# Patient Record
Sex: MALE | Race: BLACK OR AFRICAN AMERICAN | Employment: OTHER
[De-identification: names, ages, dates, MRNs, and addresses within clinical notes are randomized per-mention and may not be internally consistent; named-entity substitution may affect disease eponyms.]

---

## 2017-01-23 ENCOUNTER — OFFICE VISIT (OUTPATIENT)
Dept: FAMILY MEDICINE CLINIC | Facility: CLINIC | Age: 72
End: 2017-01-23

## 2017-01-23 VITALS
BODY MASS INDEX: 30.81 KG/M2 | WEIGHT: 208 LBS | SYSTOLIC BLOOD PRESSURE: 160 MMHG | HEIGHT: 69 IN | DIASTOLIC BLOOD PRESSURE: 50 MMHG | HEART RATE: 78 BPM

## 2017-01-23 DIAGNOSIS — I10 ESSENTIAL HYPERTENSION: ICD-10-CM

## 2017-01-23 DIAGNOSIS — D64.9 NORMOCYTIC NORMOCHROMIC ANEMIA: ICD-10-CM

## 2017-01-23 DIAGNOSIS — K76.9 HEPATIC DYSFUNCTION: ICD-10-CM

## 2017-01-23 DIAGNOSIS — E78.2 MIXED HYPERLIPIDEMIA: Primary | ICD-10-CM

## 2017-01-23 DIAGNOSIS — N18.30 RENAL FAILURE, CHRONIC, STAGE 3 (MODERATE) (HCC): ICD-10-CM

## 2017-01-23 PROCEDURE — G8427 DOCREV CUR MEDS BY ELIG CLIN: HCPCS | Performed by: FAMILY MEDICINE

## 2017-01-23 PROCEDURE — 99213 OFFICE O/P EST LOW 20 MIN: CPT | Performed by: FAMILY MEDICINE

## 2017-01-23 PROCEDURE — G8419 CALC BMI OUT NRM PARAM NOF/U: HCPCS | Performed by: FAMILY MEDICINE

## 2017-01-23 PROCEDURE — 4040F PNEUMOC VAC/ADMIN/RCVD: CPT | Performed by: FAMILY MEDICINE

## 2017-01-23 PROCEDURE — 1036F TOBACCO NON-USER: CPT | Performed by: FAMILY MEDICINE

## 2017-01-23 PROCEDURE — 3017F COLORECTAL CA SCREEN DOC REV: CPT | Performed by: FAMILY MEDICINE

## 2017-01-23 PROCEDURE — G8484 FLU IMMUNIZE NO ADMIN: HCPCS | Performed by: FAMILY MEDICINE

## 2017-01-23 PROCEDURE — 1123F ACP DISCUSS/DSCN MKR DOCD: CPT | Performed by: FAMILY MEDICINE

## 2017-01-23 RX ORDER — AMLODIPINE BESYLATE 10 MG/1
10 TABLET ORAL DAILY
Qty: 90 TABLET | Refills: 3 | Status: SHIPPED | OUTPATIENT
Start: 2017-01-23 | End: 2018-03-17 | Stop reason: SDUPTHER

## 2017-01-23 ASSESSMENT — PATIENT HEALTH QUESTIONNAIRE - PHQ9
1. LITTLE INTEREST OR PLEASURE IN DOING THINGS: 0
SUM OF ALL RESPONSES TO PHQ9 QUESTIONS 1 & 2: 0
2. FEELING DOWN, DEPRESSED OR HOPELESS: 0
SUM OF ALL RESPONSES TO PHQ QUESTIONS 1-9: 0

## 2017-02-25 ASSESSMENT — ENCOUNTER SYMPTOMS
CONSTIPATION: 0
RHINORRHEA: 0
ABDOMINAL PAIN: 0
CHEST TIGHTNESS: 0
SHORTNESS OF BREATH: 0
COUGH: 0
NAUSEA: 0
TROUBLE SWALLOWING: 0
DIARRHEA: 0
SORE THROAT: 0
BACK PAIN: 0

## 2017-04-13 ENCOUNTER — HOSPITAL ENCOUNTER (OUTPATIENT)
Age: 72
Discharge: HOME | End: 2017-04-13
Payer: MEDICARE

## 2017-04-13 DIAGNOSIS — E78.2 MIXED HYPERLIPIDEMIA: ICD-10-CM

## 2017-04-13 DIAGNOSIS — K76.9 HEPATIC DYSFUNCTION: ICD-10-CM

## 2017-04-13 LAB
ALBUMIN SERPL-MCNC: 4.4 G/DL (ref 3.5–5.2)
ALBUMIN/GLOBULIN RATIO: ABNORMAL (ref 1–2.5)
ALP BLD-CCNC: 85 U/L (ref 40–129)
ALT SERPL-CCNC: 39 U/L (ref 5–41)
AST SERPL-CCNC: 45 U/L
BILIRUB SERPL-MCNC: 0.31 MG/DL (ref 0.3–1.2)
BILIRUBIN DIRECT: <0.08 MG/DL
BILIRUBIN, INDIRECT: ABNORMAL MG/DL (ref 0–1)
GLOBULIN: ABNORMAL G/DL (ref 1.5–3.8)
TOTAL PROTEIN: 7.8 G/DL (ref 6.4–8.3)

## 2017-04-13 PROCEDURE — 36415 COLL VENOUS BLD VENIPUNCTURE: CPT

## 2017-04-13 PROCEDURE — 80076 HEPATIC FUNCTION PANEL: CPT

## 2017-04-25 ENCOUNTER — OFFICE VISIT (OUTPATIENT)
Dept: FAMILY MEDICINE CLINIC | Age: 72
End: 2017-04-25
Payer: MEDICARE

## 2017-04-25 VITALS
HEIGHT: 69 IN | SYSTOLIC BLOOD PRESSURE: 140 MMHG | DIASTOLIC BLOOD PRESSURE: 70 MMHG | BODY MASS INDEX: 30.3 KG/M2 | HEART RATE: 76 BPM | WEIGHT: 204.6 LBS

## 2017-04-25 DIAGNOSIS — I10 ESSENTIAL HYPERTENSION: Primary | ICD-10-CM

## 2017-04-25 DIAGNOSIS — K76.9 HEPATIC DYSFUNCTION: ICD-10-CM

## 2017-04-25 DIAGNOSIS — E78.2 MIXED HYPERLIPIDEMIA: ICD-10-CM

## 2017-04-25 DIAGNOSIS — Z23 NEED FOR PNEUMOCOCCAL VACCINATION: ICD-10-CM

## 2017-04-25 DIAGNOSIS — N18.30 RENAL FAILURE, CHRONIC, STAGE 3 (MODERATE) (HCC): ICD-10-CM

## 2017-04-25 DIAGNOSIS — D64.9 NORMOCYTIC NORMOCHROMIC ANEMIA: ICD-10-CM

## 2017-04-25 PROCEDURE — 99213 OFFICE O/P EST LOW 20 MIN: CPT | Performed by: FAMILY MEDICINE

## 2017-04-25 PROCEDURE — G8419 CALC BMI OUT NRM PARAM NOF/U: HCPCS | Performed by: FAMILY MEDICINE

## 2017-04-25 PROCEDURE — 4040F PNEUMOC VAC/ADMIN/RCVD: CPT | Performed by: FAMILY MEDICINE

## 2017-04-25 PROCEDURE — G8427 DOCREV CUR MEDS BY ELIG CLIN: HCPCS | Performed by: FAMILY MEDICINE

## 2017-04-25 PROCEDURE — 1036F TOBACCO NON-USER: CPT | Performed by: FAMILY MEDICINE

## 2017-04-25 PROCEDURE — 1123F ACP DISCUSS/DSCN MKR DOCD: CPT | Performed by: FAMILY MEDICINE

## 2017-04-25 PROCEDURE — 3017F COLORECTAL CA SCREEN DOC REV: CPT | Performed by: FAMILY MEDICINE

## 2017-04-25 PROCEDURE — 90670 PCV13 VACCINE IM: CPT | Performed by: FAMILY MEDICINE

## 2017-04-25 PROCEDURE — G0009 ADMIN PNEUMOCOCCAL VACCINE: HCPCS | Performed by: FAMILY MEDICINE

## 2017-04-25 RX ORDER — ATORVASTATIN CALCIUM 10 MG/1
10 TABLET, FILM COATED ORAL DAILY
Qty: 30 TABLET | Refills: 3 | Status: SHIPPED | OUTPATIENT
Start: 2017-04-25 | End: 2017-10-04 | Stop reason: ALTCHOICE

## 2017-04-25 ASSESSMENT — ENCOUNTER SYMPTOMS
CONSTIPATION: 0
SHORTNESS OF BREATH: 0
SORE THROAT: 0
ABDOMINAL PAIN: 0
TROUBLE SWALLOWING: 0
BACK PAIN: 0
CHEST TIGHTNESS: 0
COUGH: 0
RHINORRHEA: 0
NAUSEA: 0
DIARRHEA: 0

## 2017-04-25 ASSESSMENT — PATIENT HEALTH QUESTIONNAIRE - PHQ9
2. FEELING DOWN, DEPRESSED OR HOPELESS: 0
1. LITTLE INTEREST OR PLEASURE IN DOING THINGS: 0
SUM OF ALL RESPONSES TO PHQ QUESTIONS 1-9: 0
SUM OF ALL RESPONSES TO PHQ9 QUESTIONS 1 & 2: 0

## 2017-09-18 ENCOUNTER — TELEPHONE (OUTPATIENT)
Dept: FAMILY MEDICINE CLINIC | Age: 72
End: 2017-09-18

## 2017-09-27 ENCOUNTER — HOSPITAL ENCOUNTER (OUTPATIENT)
Age: 72
Discharge: HOME | End: 2017-09-27
Payer: MEDICARE

## 2017-09-27 DIAGNOSIS — I10 ESSENTIAL HYPERTENSION: ICD-10-CM

## 2017-09-27 DIAGNOSIS — D64.9 NORMOCYTIC NORMOCHROMIC ANEMIA: ICD-10-CM

## 2017-09-27 DIAGNOSIS — E78.2 MIXED HYPERLIPIDEMIA: ICD-10-CM

## 2017-09-27 LAB
ABSOLUTE EOS #: 0.2 K/UL (ref 0–0.4)
ABSOLUTE LYMPH #: 1.9 K/UL (ref 1–4.8)
ABSOLUTE MONO #: 0.4 K/UL (ref 0.2–0.8)
ALBUMIN SERPL-MCNC: 4.2 G/DL (ref 3.5–5.2)
ALBUMIN/GLOBULIN RATIO: ABNORMAL (ref 1–2.5)
ALP BLD-CCNC: 81 U/L (ref 40–129)
ALT SERPL-CCNC: 29 U/L (ref 5–41)
ANION GAP SERPL CALCULATED.3IONS-SCNC: 14 MMOL/L (ref 9–17)
AST SERPL-CCNC: 34 U/L
BASOPHILS # BLD: 2 %
BASOPHILS ABSOLUTE: 0.1 K/UL (ref 0–0.2)
BILIRUB SERPL-MCNC: 0.4 MG/DL (ref 0.3–1.2)
BUN BLDV-MCNC: 20 MG/DL (ref 8–23)
BUN/CREAT BLD: 11 (ref 9–20)
CALCIUM SERPL-MCNC: 9.6 MG/DL (ref 8.6–10.4)
CHLORIDE BLD-SCNC: 99 MMOL/L (ref 98–107)
CHOLESTEROL/HDL RATIO: 6.1
CHOLESTEROL: 244 MG/DL
CO2: 27 MMOL/L (ref 20–31)
CREAT SERPL-MCNC: 1.81 MG/DL (ref 0.7–1.2)
DIFFERENTIAL TYPE: ABNORMAL
EOSINOPHILS RELATIVE PERCENT: 3 %
GFR AFRICAN AMERICAN: 45 ML/MIN
GFR NON-AFRICAN AMERICAN: 37 ML/MIN
GFR SERPL CREATININE-BSD FRML MDRD: ABNORMAL ML/MIN/{1.73_M2}
GFR SERPL CREATININE-BSD FRML MDRD: ABNORMAL ML/MIN/{1.73_M2}
GLUCOSE BLD-MCNC: 98 MG/DL (ref 70–99)
HCT VFR BLD CALC: 38.3 % (ref 41–53)
HDLC SERPL-MCNC: 40 MG/DL
HEMOGLOBIN: 12.9 G/DL (ref 13.5–17.5)
LDL CHOLESTEROL: 182 MG/DL (ref 0–130)
LYMPHOCYTES # BLD: 29 %
MCH RBC QN AUTO: 28.2 PG (ref 26–34)
MCHC RBC AUTO-ENTMCNC: 33.5 G/DL (ref 31–37)
MCV RBC AUTO: 84 FL (ref 80–100)
MONOCYTES # BLD: 6 %
PDW BLD-RTO: 13.7 % (ref 11.5–14.5)
PLATELET # BLD: 267 K/UL (ref 130–400)
PLATELET ESTIMATE: ABNORMAL
PMV BLD AUTO: ABNORMAL FL (ref 6–12)
POTASSIUM SERPL-SCNC: 4.6 MMOL/L (ref 3.7–5.3)
RBC # BLD: 4.56 M/UL (ref 4.5–5.9)
RBC # BLD: ABNORMAL 10*6/UL
SEG NEUTROPHILS: 60 %
SEGMENTED NEUTROPHILS ABSOLUTE COUNT: 3.8 K/UL (ref 1.8–7.7)
SODIUM BLD-SCNC: 140 MMOL/L (ref 135–144)
TOTAL PROTEIN: 8.1 G/DL (ref 6.4–8.3)
TRIGL SERPL-MCNC: 112 MG/DL
VLDLC SERPL CALC-MCNC: ABNORMAL MG/DL (ref 1–30)
WBC # BLD: 6.4 K/UL (ref 3.5–11)
WBC # BLD: ABNORMAL 10*3/UL

## 2017-09-27 PROCEDURE — 80061 LIPID PANEL: CPT

## 2017-09-27 PROCEDURE — 36415 COLL VENOUS BLD VENIPUNCTURE: CPT

## 2017-09-27 PROCEDURE — 85025 COMPLETE CBC W/AUTO DIFF WBC: CPT

## 2017-09-27 PROCEDURE — 80053 COMPREHEN METABOLIC PANEL: CPT

## 2017-10-04 ENCOUNTER — OFFICE VISIT (OUTPATIENT)
Dept: FAMILY MEDICINE CLINIC | Age: 72
End: 2017-10-04
Payer: MEDICARE

## 2017-10-04 VITALS
SYSTOLIC BLOOD PRESSURE: 150 MMHG | BODY MASS INDEX: 30.45 KG/M2 | WEIGHT: 205.6 LBS | DIASTOLIC BLOOD PRESSURE: 76 MMHG | HEART RATE: 77 BPM | HEIGHT: 69 IN

## 2017-10-04 DIAGNOSIS — D64.9 NORMOCYTIC NORMOCHROMIC ANEMIA: ICD-10-CM

## 2017-10-04 DIAGNOSIS — N18.30 RENAL FAILURE, CHRONIC, STAGE 3 (MODERATE) (HCC): ICD-10-CM

## 2017-10-04 DIAGNOSIS — E78.2 MIXED HYPERLIPIDEMIA: ICD-10-CM

## 2017-10-04 DIAGNOSIS — I10 ESSENTIAL HYPERTENSION: Primary | ICD-10-CM

## 2017-10-04 PROCEDURE — 3017F COLORECTAL CA SCREEN DOC REV: CPT | Performed by: FAMILY MEDICINE

## 2017-10-04 PROCEDURE — 4040F PNEUMOC VAC/ADMIN/RCVD: CPT | Performed by: FAMILY MEDICINE

## 2017-10-04 PROCEDURE — G8417 CALC BMI ABV UP PARAM F/U: HCPCS | Performed by: FAMILY MEDICINE

## 2017-10-04 PROCEDURE — G8484 FLU IMMUNIZE NO ADMIN: HCPCS | Performed by: FAMILY MEDICINE

## 2017-10-04 PROCEDURE — 1123F ACP DISCUSS/DSCN MKR DOCD: CPT | Performed by: FAMILY MEDICINE

## 2017-10-04 PROCEDURE — 99213 OFFICE O/P EST LOW 20 MIN: CPT | Performed by: FAMILY MEDICINE

## 2017-10-04 PROCEDURE — G8427 DOCREV CUR MEDS BY ELIG CLIN: HCPCS | Performed by: FAMILY MEDICINE

## 2017-10-04 PROCEDURE — 1036F TOBACCO NON-USER: CPT | Performed by: FAMILY MEDICINE

## 2017-10-04 RX ORDER — ATORVASTATIN CALCIUM 10 MG/1
10 TABLET, FILM COATED ORAL DAILY
Qty: 30 TABLET | Refills: 3 | Status: SHIPPED | OUTPATIENT
Start: 2017-10-04 | End: 2018-01-25 | Stop reason: DRUGHIGH

## 2017-10-04 ASSESSMENT — ENCOUNTER SYMPTOMS
CONSTIPATION: 0
CHEST TIGHTNESS: 0
ABDOMINAL PAIN: 0
SORE THROAT: 0
DIARRHEA: 0
NAUSEA: 0
BACK PAIN: 0
TROUBLE SWALLOWING: 0
RHINORRHEA: 0
SHORTNESS OF BREATH: 0
COUGH: 0

## 2017-10-04 NOTE — PROGRESS NOTES
[address]  [facility]  [address] [place] [address]  Dept: [phone]  Dept Fax: [phone]    Madina Sam is a 81 y.o. male who presents today for his medical conditions/complaints as noted below. Madina Sam is c/o of Hypertension (pt here for 6 mth follow-up)      HPI:     HPI Comments: Patient is here for follow-up of his medical problems. Patient states he has been doing well. He has been seeing his nephrologist.  His kidney function is stable. Also patient states he was told to stop his statins because there was some liver problem. Has no headaches or dizziness. No chest pain or shortness of breath. Weight is unchanged. Blood pressure is stable. Nonsmoker. Social History   Substance Use Topics    Smoking status: Never Smoker    Smokeless tobacco: Never Used    Alcohol use No      Current Outpatient Prescriptions   Medication Sig Dispense Refill    atorvastatin (LIPITOR) 10 MG tablet Take 1 tablet by mouth daily 30 tablet 3    amLODIPine (NORVASC) 10 MG tablet Take 1 tablet by mouth daily 90 tablet 3     No current facility-administered medications for this visit. No Known Allergies      Subjective:      Review of Systems   Constitutional: Negative for chills, diaphoresis, fatigue and fever. HENT: Negative for congestion, ear pain, rhinorrhea, sore throat and trouble swallowing. Eyes: Negative for visual disturbance. Respiratory: Negative for cough, chest tightness and shortness of breath. Cardiovascular: Negative for chest pain, palpitations and leg swelling. Gastrointestinal: Negative for abdominal pain, constipation, diarrhea and nausea. Endocrine: Negative for polydipsia and polyuria. Genitourinary: Negative for difficulty urinating, dysuria and hematuria. Musculoskeletal: Negative for arthralgias, back pain, gait problem and neck pain. Skin: Negative for rash.    Neurological: Negative for dizziness, Standing Expiration Date:   10/4/2018    AST     Standing Status:   Future     Standing Expiration Date:   10/4/2018     Orders Placed This Encounter   Medications    atorvastatin (LIPITOR) 10 MG tablet     Sig: Take 1 tablet by mouth daily     Dispense:  30 tablet     Refill:  3         Findings and/or pathophysiology discussed with patient. Plan of treatment discussed. Patient's medical problems were discussed with him. Findings were explained. His lab work was discussed. His lipids are elevated. His liver function is normal.  Will restart atorvastatin at 10 mg daily. We will repeat his lab work in 3 months time. His other medications were evaluated. Health maintenance was discussed. Weight management was discussed. Diet and activity were discussed. He will follow-up with his nephrologist.  Patient exercises 6 days a week. 1.  Luisa Going received counseling on the following healthy behaviors: nutrition and exercise  2. Patient given educational materials - see patient instructions  3. Was a self-tracking handout given in paper form or via [employer]? No  If yes, see orders or list here. 4.  Discussed use, benefit, and side effects of prescribed medications. Barriers to medication compliance addressed. All patient questions answered. Pt voiced understanding. 5.  Reviewed prior labs and health maintenance  6. Continue current medications, diet and exercise.     Completed Refills   Requested Prescriptions     Signed Prescriptions Disp Refills    atorvastatin (LIPITOR) 10 MG tablet 30 tablet 3     Sig: Take 1 tablet by mouth daily     Electronically signed by Je Marrufo MD on 10/4/2017 at 12:41 PM

## 2017-10-04 NOTE — PROGRESS NOTES
Chronic Disease Visit Information    BP Readings from Last 3 Encounters:   04/25/17 (!) 140/70   01/23/17 (!) 160/50   10/24/16 125/78          LDL Cholesterol (mg/dL)   Date Value   09/27/2017 182 (H)     HDL (mg/dL)   Date Value   09/27/2017 40 (L)     BUN (mg/dL)   Date Value   09/27/2017 20     CREATININE (mg/dL)   Date Value   09/27/2017 1.81 (H)     Glucose (mg/dL)   Date Value   09/27/2017 98            Have you changed or started any medications since your last visit including any over-the-counter medicines, vitamins, or herbal medicines? no   Are you having any side effects from any of your medications? -  no  Have you stopped taking any of your medications? Is so, why? -  no    Have you seen any other physician or provider since your last visit? Yes - Records Obtained  Have you had any other diagnostic tests since your last visit? Yes - Records Obtained  Have you been seen in the emergency room and/or had an admission to a hospital since we last saw you? No  Have you had your annual diabetic retinal (eye) exam? No  Have you had your routine dental cleaning in the past 6 months? yes -     Have you activated your [employer] account? If not, what are your barriers?  Yes     Patient Care Team:  Anika Bernabe MD as PCP - General (Family Medicine)  Anika Bernabe MD as PCP - [facility] Attributed Provider  Britt Bowling MD as Consulting Physician (Urology)         Medical History Review  Past Medical, Family, and Social History reviewed and does contribute to the patient presenting condition    Health Maintenance   Topic Date Due    DTaP/Tdap/Td vaccine (1 - Tdap) 04/25/2018 (Originally [birth year]24/1964)    Flu vaccine (1) 10/04/2018 (Originally 9/1/2017)    Colon cancer screen colonoscopy  04/01/2022    Lipid screen  09/27/2022    Zostavax vaccine  Addressed    Pneumococcal low/med risk  Completed    Hepatitis C screen  Completed

## 2017-10-04 NOTE — MR AVS SNAPSHOT
After Visit Summary             Reema Moreno   10/4/2017 9:15 AM   Office Visit    Description:  Male : 1945   Provider:  Skyla Beckman MD   Department:  [address] [place] [address] Drive and Future Appointments         Below is a list of your follow-up and future appointments. This may not be a complete list as you may have made appointments directly with providers that we are not aware of or your providers may have made some for you. Please call your providers to confirm appointments. It is important to keep your appointments. Please bring your current insurance card, photo ID, co-pay, and all medication bottles to your appointment. If self-pay, payment is expected at the time of service. Your To-Do List     Future Appointments Provider Department Dept Phone    2018 9:15 AM [place] [address] [phone]    Please arrive 15 minutes prior to appointment, bring photo ID and insurance card. Future Orders Complete By Expires    ALT [LJE247 Custom]  10/4/2017 10/4/2018    AST [[id number] Custom]  10/4/2017 10/4/2018    Follow-Up    Return in about 3 months (around 2018). Information from Your Visit        Department     Name Address Phone Fax    [address] [place] [address] [phone]      You Were Seen for:         Comments    Essential hypertension   [003282]         Vital Signs     Blood Pressure Pulse Height Weight Body Mass Index Smoking Status    150/76 (Site: Left Arm, Position: Sitting, Cuff Size: Large Adult) 77 5' 9\" (1.753 m) 205 lb 9.6 oz (93.3 kg) 30.36 kg/m2 Never Smoker      Additional Information about your Body Mass Index (BMI)           Your BMI as listed above is considered obese (30 or more). BMI is an estimate of body fat, calculated from your height and weight.   The higher Be safe with medicines. Take your medicine exactly as prescribed. Call your doctor if you think you are having a problem with your medicine. · Talk to your doctor before you start taking aspirin every day. Aspirin can help certain people lower their risk of a heart attack or stroke. But taking aspirin isn't right for everyone, because it can cause serious bleeding. · See your doctor regularly. You may need to see the doctor more often at first or until your blood pressure comes down. · If you are taking blood pressure medicine, talk to your doctor before you take decongestants or anti-inflammatory medicine, such as ibuprofen. Some of these medicines can raise blood pressure. · Learn how to check your blood pressure at home. Lifestyle changes  · Stay at a healthy weight. This is especially important if you put on weight around the waist. Losing even 10 pounds can help you lower your blood pressure. · If your doctor recommends it, get more exercise. Walking is a good choice. Bit by bit, increase the amount you walk every day. Try for at least 30 minutes on most days of the week. You also may want to swim, bike, or do other activities. · Avoid or limit alcohol. Talk to your doctor about whether you can drink any alcohol. · Try to limit how much sodium you eat to less than 2,300 milligrams (mg) a day. Your doctor may ask you to try to eat less than 1,500 mg a day. · Eat plenty of fruits (such as bananas and oranges), vegetables, legumes, whole grains, and low-fat dairy products. · Lower the amount of saturated fat in your diet. Saturated fat is found in animal products such as milk, cheese, and meat. Limiting these foods may help you lose weight and also lower your risk for heart disease. · Do not smoke. Smoking increases your risk for heart attack and stroke. If you need help quitting, talk to your doctor about stop-smoking programs and medicines. These can increase your chances of quitting for good. When should you call for help? Call 911 anytime you think you may need emergency care. This may mean having symptoms that suggest that your blood pressure is causing a serious heart or blood vessel problem. Your blood pressure may be over 180/110. For example, call 911 if:  · You have symptoms of a heart attack. These may include:  ¨ Chest pain or pressure, or a strange feeling in the chest.  ¨ Sweating. ¨ Shortness of breath. ¨ Nausea or vomiting. ¨ Pain, pressure, or a strange feeling in the back, neck, jaw, or upper belly or in one or both shoulders or arms. ¨ Lightheadedness or sudden weakness. ¨ A fast or irregular heartbeat. · You have symptoms of a stroke. These may include:  ¨ Sudden numbness, tingling, weakness, or loss of movement in your face, arm, or leg, especially on only one side of your body. ¨ Sudden vision changes. ¨ Sudden trouble speaking. ¨ Sudden confusion or trouble understanding simple statements. ¨ Sudden problems with walking or balance. ¨ A sudden, severe headache that is different from past headaches. · You have severe back or belly pain. Do not wait until your blood pressure comes down on its own. Get help right away. Call your doctor now or seek immediate care if:  · Your blood pressure is much higher than normal (such as 180/110 or higher), but you don't have symptoms. · You think high blood pressure is causing symptoms, such as:  ¨ Severe headache. ¨ Blurry vision. Watch closely for changes in your health, and be sure to contact your doctor if:  · Your blood pressure measures 140/90 or higher at least 2 times. That means the top number is 140 or higher or the bottom number is 90 or higher, or both. · You think you may be having side effects from your blood pressure medicine. · Your blood pressure is usually normal, but it goes above normal at least 2 times. Where can you learn more? Go to [email or web address]://chfortinoeb.[employer]. org and sign in to your MyChart Urinary tract infection    Sepsis ([facility]    Acute on chronic kidney failure ([address]    ARF (acute renal failure) ([facility] [address] [facility]    Obstructive uropathy    S/P laminectomy    Renal dysfunction    Right lumbosacral radiculopathy    Sprain hip/thigh    Unstable gait    Obesity    Sprain, lumbosacral      Immunizations as of 10/4/2017     Name Date    Pneumococcal 13-valent Conjugate (Tyrone Handsome) 4/25/2017    Pneumococcal Polysaccharide ([id number]) 12/18/2014      Preventive Care        Date Due    Tetanus Combination Vaccine (1 - Tdap) 4/25/2018 (Originally 4/24/1966)    Yearly Flu Vaccine (1) 10/4/2018 (Originally 9/1/2017)    Colonoscopy 4/1/2022    Cholesterol Screening 9/27/2022            [employer] Signup           Our records indicate that you have an active [employer] account. You can view your After Visit Summary by going to [email or web address] and logging in with your [employer] username and password. If you don't have a [employer] username and password but a parent or guardian has access to your record, the parent or guardian should login with their own [employer] username and password and access your record to view the After Visit Summary. Additional Information  If you have questions, please contact the physician practice where you receive care. Remember, [employer] is NOT to be used for urgent needs. For medical emergencies, dial 911. For questions regarding your [employer] account call [phone]. If you have a clinical question, please call your doctor's office.

## 2018-01-16 ENCOUNTER — HOSPITAL ENCOUNTER (OUTPATIENT)
Age: 73
Discharge: HOME | End: 2018-01-16
Payer: MEDICARE

## 2018-01-16 DIAGNOSIS — E78.2 MIXED HYPERLIPIDEMIA: ICD-10-CM

## 2018-01-16 LAB
ALT SERPL-CCNC: 32 U/L (ref 5–41)
AST SERPL-CCNC: 39 U/L

## 2018-01-16 PROCEDURE — 84460 ALANINE AMINO (ALT) (SGPT): CPT

## 2018-01-16 PROCEDURE — 36415 COLL VENOUS BLD VENIPUNCTURE: CPT

## 2018-01-16 PROCEDURE — 84450 TRANSFERASE (AST) (SGOT): CPT

## 2018-01-25 ENCOUNTER — OFFICE VISIT (OUTPATIENT)
Dept: FAMILY MEDICINE CLINIC | Age: 73
End: 2018-01-25
Payer: MEDICARE

## 2018-01-25 VITALS
WEIGHT: 202.2 LBS | BODY MASS INDEX: 29.95 KG/M2 | HEART RATE: 70 BPM | SYSTOLIC BLOOD PRESSURE: 161 MMHG | DIASTOLIC BLOOD PRESSURE: 82 MMHG | HEIGHT: 69 IN

## 2018-01-25 DIAGNOSIS — D63.1 ANEMIA IN STAGE 3 CHRONIC KIDNEY DISEASE (HCC): Chronic | ICD-10-CM

## 2018-01-25 DIAGNOSIS — N18.30 ANEMIA IN STAGE 3 CHRONIC KIDNEY DISEASE (HCC): Chronic | ICD-10-CM

## 2018-01-25 DIAGNOSIS — N18.30 CHRONIC RENAL FAILURE, STAGE 3 (MODERATE) (HCC): ICD-10-CM

## 2018-01-25 DIAGNOSIS — E66.3 OVERWEIGHT: ICD-10-CM

## 2018-01-25 DIAGNOSIS — I10 ESSENTIAL HYPERTENSION: Primary | ICD-10-CM

## 2018-01-25 DIAGNOSIS — E78.2 MIXED HYPERLIPIDEMIA: ICD-10-CM

## 2018-01-25 PROCEDURE — 3017F COLORECTAL CA SCREEN DOC REV: CPT | Performed by: FAMILY MEDICINE

## 2018-01-25 PROCEDURE — 4040F PNEUMOC VAC/ADMIN/RCVD: CPT | Performed by: FAMILY MEDICINE

## 2018-01-25 PROCEDURE — 99213 OFFICE O/P EST LOW 20 MIN: CPT | Performed by: FAMILY MEDICINE

## 2018-01-25 PROCEDURE — 1123F ACP DISCUSS/DSCN MKR DOCD: CPT | Performed by: FAMILY MEDICINE

## 2018-01-25 PROCEDURE — 1036F TOBACCO NON-USER: CPT | Performed by: FAMILY MEDICINE

## 2018-01-25 PROCEDURE — G8427 DOCREV CUR MEDS BY ELIG CLIN: HCPCS | Performed by: FAMILY MEDICINE

## 2018-01-25 PROCEDURE — G8417 CALC BMI ABV UP PARAM F/U: HCPCS | Performed by: FAMILY MEDICINE

## 2018-01-25 PROCEDURE — G8484 FLU IMMUNIZE NO ADMIN: HCPCS | Performed by: FAMILY MEDICINE

## 2018-01-25 RX ORDER — ATORVASTATIN CALCIUM 20 MG/1
20 TABLET, FILM COATED ORAL DAILY
Qty: 30 TABLET | Refills: 3 | Status: SHIPPED | OUTPATIENT
Start: 2018-01-25 | End: 2018-04-25 | Stop reason: SDUPTHER

## 2018-01-25 ASSESSMENT — ENCOUNTER SYMPTOMS
BACK PAIN: 0
DIARRHEA: 0
CHEST TIGHTNESS: 0
SORE THROAT: 0
CONSTIPATION: 0
NAUSEA: 0
RHINORRHEA: 0
ABDOMINAL PAIN: 0
COUGH: 0
SHORTNESS OF BREATH: 0
TROUBLE SWALLOWING: 0

## 2018-01-25 NOTE — PROGRESS NOTES
Negative for dysphoric mood and sleep disturbance. The patient is not nervous/anxious. Objective:     Physical Exam   Constitutional: He is oriented to person, place, and time. He appears well-developed and well-nourished. HENT:   Head: Normocephalic. Nose: Mucosal edema present. Mouth/Throat: Oropharynx is clear and moist. No oropharyngeal exudate. Eyes: Pupils are equal, round, and reactive to light. No scleral icterus. Neck: Normal range of motion. Neck supple. No JVD present. No thyromegaly present. Neck no bruits. Cardiovascular: Normal rate, regular rhythm and normal heart sounds. Exam reveals no gallop. No murmur heard. Pulmonary/Chest: Effort normal and breath sounds normal. He has no wheezes. He has no rales. Abdominal: Soft. Bowel sounds are normal. He exhibits no mass. There is no tenderness. Liver & spleen are not palpable. Musculoskeletal: Normal range of motion. He exhibits no edema or tenderness. Lumbar back: Normal.   He moves all his limbs well. There is some stiffness of his hip joints and lower back. SLR about 60°. No tremors noted. Gait is stable. Lymphadenopathy:     He has no cervical adenopathy. Neurological: He is alert and oriented to person, place, and time. He has normal reflexes. Coordination and gait normal.   Skin: Skin is warm and dry. No rash noted. Psychiatric: He has a normal mood and affect. His behavior is normal. Judgment and thought content normal.   Nursing note and vitals reviewed. BP (!) 161/81 (Site: Left Arm, Position: Sitting, Cuff Size: Large Adult)   Pulse 74   Ht 5' 9.02\" (1.753 m)   Wt 202 lb 3.2 oz (91.7 kg)   BMI 29.85 kg/m²     Assessment:      1. Essential hypertension     2. Mixed hyperlipidemia  atorvastatin (LIPITOR) 20 MG tablet    Lipid Panel    AST    ALT   3. Chronic renal failure, stage 3 (moderate)     4. 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  Overweight         Plan:      No Follow-up on (LIPITOR) 20 MG tablet 30 tablet 3     Sig: Take 1 tablet by mouth daily     Electronically signed by Rachel Mcmanus MD on 1/25/2018 at 10:21 AM

## 2018-01-25 NOTE — PATIENT INSTRUCTIONS
take decongestants or anti-inflammatory medicine, such as ibuprofen. Some of these medicines can raise blood pressure. · Learn how to check your blood pressure at home. Lifestyle changes  · Stay at a healthy weight. This is especially important if you put on weight around the waist. Losing even 10 pounds can help you lower your blood pressure. · If your doctor recommends it, get more exercise. Walking is a good choice. Bit by bit, increase the amount you walk every day. Try for at least 30 minutes on most days of the week. You also may want to swim, bike, or do other activities. · Avoid or limit alcohol. Talk to your doctor about whether you can drink any alcohol. · Try to limit how much sodium you eat to less than 2,300 milligrams (mg) a day. Your doctor may ask you to try to eat less than 1,500 mg a day. · Eat plenty of fruits (such as bananas and oranges), vegetables, legumes, whole grains, and low-fat dairy products. · Lower the amount of saturated fat in your diet. Saturated fat is found in animal products such as milk, cheese, and meat. Limiting these foods may help you lose weight and also lower your risk for heart disease. · Do not smoke. Smoking increases your risk for heart attack and stroke. If you need help quitting, talk to your doctor about stop-smoking programs and medicines. These can increase your chances of quitting for good. When should you call for help? Call 911 anytime you think you may need emergency care. This may mean having symptoms that suggest that your blood pressure is causing a serious heart or blood vessel problem. Your blood pressure may be over 180/110. ? For example, call 911 if:  ? · You have symptoms of a heart attack. These may include:  ¨ Chest pain or pressure, or a strange feeling in the chest.  ¨ Sweating. ¨ Shortness of breath. ¨ Nausea or vomiting.   ¨ Pain, pressure, or a strange feeling in the back, neck, jaw, or upper belly or in one or both shoulders or information.

## 2018-01-25 NOTE — PROGRESS NOTES
Chronic Disease Visit Information    BP Readings from Last 3 Encounters:   10/04/17 (!) 150/76   04/25/17 (!) 140/70   01/23/17 (!) 160/50          LDL Cholesterol (mg/dL)   Date Value   09/27/2017 182 (H)     HDL (mg/dL)   Date Value   09/27/2017 40 (L)     BUN (mg/dL)   Date Value   09/27/2017 20     CREATININE (mg/dL)   Date Value   09/27/2017 1.81 (H)     Glucose (mg/dL)   Date Value   09/27/2017 98            Have you changed or started any medications since your last visit including any over-the-counter medicines, vitamins, or herbal medicines? no   Are you having any side effects from any of your medications? -  no  Have you stopped taking any of your medications? Is so, why? -  no    Have you seen any other physician or provider since your last visit? No  Have you had any other diagnostic tests since your last visit? No  Have you been seen in the emergency room and/or had an admission to a hospital since we last saw you? No  Have you had your annual diabetic retinal (eye) exam? No  Have you had your routine dental cleaning in the past 6 months? no    Have you activated your [employer] account? If not, what are your barriers?  Yes     Patient Care Team:  Meet Anton MD as PCP - General (Family Medicine)  Meet Anton MD as PCP - S Attributed Provider  Gaurav Casey MD as Consulting Physician (Urology)         Medical History Review  Past Medical, Family, and Social History reviewed and does contribute to the patient presenting condition    Health Maintenance   Topic Date Due    DTaP/Tdap/Td vaccine (1 - Tdap) 04/25/2018 (Originally 4/24/1966)    Flu vaccine (1) 10/04/2018 (Originally 9/1/2017)    Potassium monitoring  09/27/2018    Creatinine monitoring  09/27/2018    Colon cancer screen colonoscopy  04/01/2022    Lipid screen  09/27/2022    Zostavax vaccine  Addressed    Pneumococcal low/med risk  Completed    Hepatitis C screen  Completed

## 2018-02-02 DIAGNOSIS — E78.2 MIXED HYPERLIPIDEMIA: ICD-10-CM

## 2018-03-17 DIAGNOSIS — I10 ESSENTIAL HYPERTENSION: ICD-10-CM

## 2018-03-19 NOTE — TELEPHONE ENCOUNTER
Requested Prescriptions     Pending Prescriptions Disp Refills    amLODIPine (NORVASC) 10 MG tablet [Pharmacy Med Name:  AmLODIPine Besylate 10MG TAB] 90 tablet 3     Sig: TAKE ONE TABLET BY MOUTH ONCE DAILY     Next Visit Date:  Future Appointments  Date Time Provider Gabriela Andrade   4/25/2018 9:00 AM Judy Roche MD [address] Maintenance   Topic Date Due    Shingles Vaccine (1 of 2 - 2 Dose Series) 04/24/1997    DTaP/Tdap/Td vaccine (1 - Tdap) 04/25/2018 (Originally 4/24/1966)    Flu vaccine (1) 10/04/2018 (Originally 9/1/2017)    Potassium monitoring  09/27/2018    Creatinine monitoring  09/27/2018    Colon cancer screen colonoscopy  04/01/2022    Lipid screen  09/27/2022    Pneumococcal low/med risk  Completed    Hepatitis C screen  Completed       No results found for: LABA1C          ( goal A1C is < 7)   No results found for: LABMICR  LDL Cholesterol (mg/dL)   Date Value   09/27/2017 182 (H)       (goal LDL is <100)   AST (U/L)   Date Value   01/16/2018 39     ALT (U/L)   Date Value   01/16/2018 32     BUN (mg/dL)   Date Value   09/27/2017 20     BP Readings from Last 3 Encounters:   01/25/18 (!) 161/82   10/04/17 (!) 150/76   04/25/17 (!) 140/70          (goal 120/80)    All Future Testing planned in [facility]  Lab Frequency Next Occurrence   Lipid Panel Once 07/24/2018               Patient Active Problem List:     Obesity     Sprain, lumbosacral     Sprain hip/thigh     Unstable gait     Right lumbosacral radiculopathy     S/P laminectomy     Renal dysfunction     ARF (acute renal failure) (HCC)     Obstructive uropathy     Urinary tract infection     Sepsis ([address]     Bladder outlet obstruction     Sepsis due to Klebsiella pneumoniae ([address]     Anemia in CKD (chronic kidney disease) ([address]     Problem with medical care compliance     Hepatic dysfunction     Mixed hyperlipidemia     Essential hypertension     Renal failure, chronic     Normocytic normochromic anemia Non morbid obesity due to excess calories     Overweight

## 2018-03-20 RX ORDER — AMLODIPINE BESYLATE 10 MG/1
TABLET ORAL
Qty: 90 TABLET | Refills: 3 | Status: SHIPPED | OUTPATIENT
Start: 2018-03-20 | End: 2019-05-30 | Stop reason: SDUPTHER

## 2018-04-17 ENCOUNTER — HOSPITAL ENCOUNTER (OUTPATIENT)
Age: 73
Discharge: HOME | End: 2018-04-17
Payer: MEDICARE

## 2018-04-17 DIAGNOSIS — E78.2 MIXED HYPERLIPIDEMIA: ICD-10-CM

## 2018-04-17 LAB
ALT SERPL-CCNC: 37 U/L (ref 5–41)
AST SERPL-CCNC: 38 U/L
CHOLESTEROL/HDL RATIO: 5
CHOLESTEROL: 219 MG/DL
HDLC SERPL-MCNC: 44 MG/DL
LDL CHOLESTEROL: 152 MG/DL (ref 0–130)
TRIGL SERPL-MCNC: 114 MG/DL
VLDLC SERPL CALC-MCNC: ABNORMAL MG/DL (ref 1–30)

## 2018-04-17 PROCEDURE — 80061 LIPID PANEL: CPT

## 2018-04-17 PROCEDURE — 84450 TRANSFERASE (AST) (SGOT): CPT

## 2018-04-17 PROCEDURE — 36415 COLL VENOUS BLD VENIPUNCTURE: CPT

## 2018-04-17 PROCEDURE — 84460 ALANINE AMINO (ALT) (SGPT): CPT

## 2018-04-25 ENCOUNTER — OFFICE VISIT (OUTPATIENT)
Dept: FAMILY MEDICINE CLINIC | Age: 73
End: 2018-04-25
Payer: MEDICARE

## 2018-04-25 VITALS
HEIGHT: 69 IN | DIASTOLIC BLOOD PRESSURE: 82 MMHG | BODY MASS INDEX: 30.07 KG/M2 | SYSTOLIC BLOOD PRESSURE: 150 MMHG | HEART RATE: 75 BPM | WEIGHT: 203 LBS

## 2018-04-25 DIAGNOSIS — I10 ESSENTIAL HYPERTENSION: Primary | ICD-10-CM

## 2018-04-25 DIAGNOSIS — E78.2 MIXED HYPERLIPIDEMIA: ICD-10-CM

## 2018-04-25 PROCEDURE — 4040F PNEUMOC VAC/ADMIN/RCVD: CPT | Performed by: PHYSICIAN ASSISTANT

## 2018-04-25 PROCEDURE — 3017F COLORECTAL CA SCREEN DOC REV: CPT | Performed by: PHYSICIAN ASSISTANT

## 2018-04-25 PROCEDURE — 1123F ACP DISCUSS/DSCN MKR DOCD: CPT | Performed by: PHYSICIAN ASSISTANT

## 2018-04-25 PROCEDURE — G8417 CALC BMI ABV UP PARAM F/U: HCPCS | Performed by: PHYSICIAN ASSISTANT

## 2018-04-25 PROCEDURE — 99213 OFFICE O/P EST LOW 20 MIN: CPT | Performed by: PHYSICIAN ASSISTANT

## 2018-04-25 PROCEDURE — G8427 DOCREV CUR MEDS BY ELIG CLIN: HCPCS | Performed by: PHYSICIAN ASSISTANT

## 2018-04-25 PROCEDURE — 1036F TOBACCO NON-USER: CPT | Performed by: PHYSICIAN ASSISTANT

## 2018-04-25 RX ORDER — ATORVASTATIN CALCIUM 20 MG/1
20 TABLET, FILM COATED ORAL DAILY
Qty: 30 TABLET | Refills: 3 | Status: SHIPPED | OUTPATIENT
Start: 2018-04-25 | End: 2018-08-27 | Stop reason: SDUPTHER

## 2018-04-25 ASSESSMENT — ENCOUNTER SYMPTOMS
NAUSEA: 0
VOMITING: 0
SHORTNESS OF BREATH: 0
BACK PAIN: 0

## 2018-08-27 ENCOUNTER — OFFICE VISIT (OUTPATIENT)
Dept: FAMILY MEDICINE CLINIC | Age: 73
End: 2018-08-27
Payer: MEDICARE

## 2018-08-27 VITALS
OXYGEN SATURATION: 99 % | DIASTOLIC BLOOD PRESSURE: 86 MMHG | HEART RATE: 77 BPM | SYSTOLIC BLOOD PRESSURE: 134 MMHG | WEIGHT: 200.2 LBS | HEIGHT: 69 IN | BODY MASS INDEX: 29.65 KG/M2 | RESPIRATION RATE: 21 BRPM

## 2018-08-27 DIAGNOSIS — E78.2 MIXED HYPERLIPIDEMIA: ICD-10-CM

## 2018-08-27 DIAGNOSIS — E66.3 OVERWEIGHT: ICD-10-CM

## 2018-08-27 DIAGNOSIS — Z00.00 ANNUAL PHYSICAL EXAM: Primary | ICD-10-CM

## 2018-08-27 DIAGNOSIS — I10 ESSENTIAL HYPERTENSION: ICD-10-CM

## 2018-08-27 PROCEDURE — 99214 OFFICE O/P EST MOD 30 MIN: CPT | Performed by: PHYSICIAN ASSISTANT

## 2018-08-27 RX ORDER — ATORVASTATIN CALCIUM 20 MG/1
20 TABLET, FILM COATED ORAL DAILY
Qty: 90 TABLET | Refills: 1 | Status: SHIPPED | OUTPATIENT
Start: 2018-08-27 | End: 2020-01-14 | Stop reason: SDUPTHER

## 2018-08-27 ASSESSMENT — PATIENT HEALTH QUESTIONNAIRE - PHQ9
SUM OF ALL RESPONSES TO PHQ9 QUESTIONS 1 & 2: 0
SUM OF ALL RESPONSES TO PHQ QUESTIONS 1-9: 0
SUM OF ALL RESPONSES TO PHQ QUESTIONS 1-9: 0
2. FEELING DOWN, DEPRESSED OR HOPELESS: 0
1. LITTLE INTEREST OR PLEASURE IN DOING THINGS: 0

## 2018-08-27 ASSESSMENT — ENCOUNTER SYMPTOMS
SPUTUM PRODUCTION: 0
DOUBLE VISION: 0
ABDOMINAL PAIN: 0
DIARRHEA: 0
BLURRED VISION: 0
SORE THROAT: 0
SHORTNESS OF BREATH: 0
BACK PAIN: 0
COUGH: 0
CONSTIPATION: 0
VOMITING: 0
NAUSEA: 0

## 2018-08-27 NOTE — PROGRESS NOTES
round, and reactive to light. Conjunctivae and lids are normal.   Neck: Normal range of motion. Cardiovascular: Normal rate, regular rhythm and normal heart sounds. Pulmonary/Chest: Effort normal and breath sounds normal.   Abdominal: Soft. He exhibits no mass. There is no tenderness. Musculoskeletal: He exhibits no edema or tenderness. Neurological: He is alert and oriented to person, place, and time. Gait normal.   Skin: Skin is warm and dry. Psychiatric: Mood and affect normal.   Vitals reviewed. DIAGNOSTIC FINDINGS:  CBC:  Lab Results   Component Value Date    WBC 6.4 09/27/2017    HGB 12.9 09/27/2017     09/27/2017       BMP:    Lab Results   Component Value Date     09/27/2017    K 4.6 09/27/2017    CL 99 09/27/2017    CO2 27 09/27/2017    BUN 20 09/27/2017    CREATININE 1.81 09/27/2017    GLUCOSE 98 09/27/2017       HEMOGLOBIN A1C: No results found for: LABA1C    FASTING LIPID PANEL:  Lab Results   Component Value Date    CHOL 219 (H) 04/17/2018    HDL 44 04/17/2018    TRIG 114 04/17/2018       ASSESSMENT AND PLAN:  Ashleigh Ceja was seen today for hypertension and health maintenance. Diagnoses and all orders for this visit:    Annual physical exam    Essential hypertension    Mixed hyperlipidemia  -     atorvastatin (LIPITOR) 20 MG tablet; Take 1 tablet by mouth daily      FOLLOW UP AND INSTRUCTIONS:  Return in about 4 months (around 12/27/2018) for HTN, HLD. · Discussed use, benefit, and side effects of prescribed medications. Barriers to medication compliance addressed. All patient questions answered. Pt voiced understanding.      · Patient given educational materials - see patient instructions    Electronically signed by Jewel Grier PA-C on 8/27/18 at 9:25 AM    This note is created with the assistance of a speech-recognition program. While intending to generate a document that actually reflects the content of the visit, the document can still have some mistakes which may not have been identified and corrected by editing.

## 2019-05-30 DIAGNOSIS — I10 ESSENTIAL HYPERTENSION: ICD-10-CM

## 2019-05-30 NOTE — TELEPHONE ENCOUNTER
Health Maintenance   Topic Date Due    DTaP/Tdap/Td vaccine (1 - Tdap) 04/24/1966    Shingles Vaccine (1 of 2) 04/24/1997    Potassium monitoring  09/27/2018    Creatinine monitoring  09/27/2018    Flu vaccine (Season Ended) 09/01/2019    Colon cancer screen colonoscopy  04/17/2022    Lipid screen  04/17/2023    Pneumococcal 65+ years Vaccine  Completed    Hepatitis C screen  Completed             (applicable per patient's age: Cancer Screenings, Depression Screening, Fall Risk Screening, Immunizations)    LDL Cholesterol (mg/dL)   Date Value   04/17/2018 152 (H)     AST (U/L)   Date Value   04/17/2018 38     ALT (U/L)   Date Value   04/17/2018 37     BUN (mg/dL)   Date Value   09/27/2017 20      (goal A1C is < 7)   (goal LDL is <100) need 30-50% reduction from baseline     BP Readings from Last 3 Encounters:   08/27/18 134/86   04/25/18 (!) 150/82   01/25/18 (!) 161/82    (goal /80)      All Future Testing planned in [facility]:  Lab Frequency Next Occurrence       Next Visit Date:  No future appointments.          Patient Active Problem List:     Sprain, lumbosacral     Unstable gait     Right lumbosacral radiculopathy     S/P laminectomy     ARF (acute renal failure) (HCC)     Obstructive uropathy     Bladder outlet obstruction     Sepsis due to Klebsiella pneumoniae ([address]     Anemia in CKD (chronic kidney disease) ([address]     Problem with medical care compliance     Hepatic dysfunction     Mixed hyperlipidemia     Essential hypertension     Renal failure, chronic     Normocytic normochromic anemia     Overweight

## 2019-05-31 RX ORDER — AMLODIPINE BESYLATE 10 MG/1
TABLET ORAL
Qty: 90 TABLET | Refills: 0 | Status: SHIPPED | OUTPATIENT
Start: 2019-05-31 | End: 2020-01-14 | Stop reason: SDUPTHER

## 2020-01-03 ENCOUNTER — APPOINTMENT (OUTPATIENT)
Dept: CT IMAGING | Age: 75
End: 2020-01-03
Payer: MEDICARE

## 2020-01-03 ENCOUNTER — HOSPITAL ENCOUNTER (EMERGENCY)
Age: 75
Discharge: HOME | End: 2020-01-03
Attending: EMERGENCY MEDICINE
Payer: MEDICARE

## 2020-01-03 VITALS
BODY MASS INDEX: 29.77 KG/M2 | DIASTOLIC BLOOD PRESSURE: 86 MMHG | RESPIRATION RATE: 14 BRPM | WEIGHT: 201 LBS | HEIGHT: 69 IN | OXYGEN SATURATION: 100 % | HEART RATE: 75 BPM | SYSTOLIC BLOOD PRESSURE: 193 MMHG | TEMPERATURE: 98.2 F

## 2020-01-03 LAB
ABSOLUTE EOS #: 0.26 K/UL (ref 0–0.44)
ABSOLUTE IMMATURE GRANULOCYTE: 0.01 K/UL (ref 0–0.3)
ABSOLUTE LYMPH #: 1.22 K/UL (ref 1.1–3.7)
ABSOLUTE MONO #: 0.66 K/UL (ref 0.1–1.2)
ANION GAP SERPL CALCULATED.3IONS-SCNC: 12 MMOL/L (ref 9–17)
BASOPHILS # BLD: 0 % (ref 0–2)
BASOPHILS ABSOLUTE: 0.03 K/UL (ref 0–0.2)
BUN BLDV-MCNC: 28 MG/DL (ref 8–23)
BUN/CREAT BLD: 16 (ref 9–20)
CALCIUM SERPL-MCNC: 9.9 MG/DL (ref 8.6–10.4)
CHLORIDE BLD-SCNC: 101 MMOL/L (ref 98–107)
CO2: 24 MMOL/L (ref 20–31)
CREAT SERPL-MCNC: 1.79 MG/DL (ref 0.7–1.2)
DIFFERENTIAL TYPE: ABNORMAL
EOSINOPHILS RELATIVE PERCENT: 4 % (ref 1–4)
GFR AFRICAN AMERICAN: 45 ML/MIN
GFR NON-AFRICAN AMERICAN: 38 ML/MIN
GFR SERPL CREATININE-BSD FRML MDRD: ABNORMAL ML/MIN/{1.73_M2}
GFR SERPL CREATININE-BSD FRML MDRD: ABNORMAL ML/MIN/{1.73_M2}
GLUCOSE BLD-MCNC: 98 MG/DL (ref 70–99)
HCT VFR BLD CALC: 44 % (ref 40.7–50.3)
HEMOGLOBIN: 14.3 G/DL (ref 13–17)
IMMATURE GRANULOCYTES: 0 %
LYMPHOCYTES # BLD: 17 % (ref 24–43)
MCH RBC QN AUTO: 27.4 PG (ref 25.2–33.5)
MCHC RBC AUTO-ENTMCNC: 32.5 G/DL (ref 28.4–34.8)
MCV RBC AUTO: 84.3 FL (ref 82.6–102.9)
MONOCYTES # BLD: 9 % (ref 3–12)
NRBC AUTOMATED: 0 PER 100 WBC
PDW BLD-RTO: 14.8 % (ref 11.8–14.4)
PLATELET # BLD: 225 K/UL (ref 138–453)
PLATELET ESTIMATE: ABNORMAL
PMV BLD AUTO: 10.8 FL (ref 8.1–13.5)
POTASSIUM SERPL-SCNC: 4.4 MMOL/L (ref 3.7–5.3)
RBC # BLD: 5.22 M/UL (ref 4.21–5.77)
RBC # BLD: ABNORMAL 10*6/UL
SEG NEUTROPHILS: 70 % (ref 36–65)
SEGMENTED NEUTROPHILS ABSOLUTE COUNT: 5.1 K/UL (ref 1.5–8.1)
SODIUM BLD-SCNC: 137 MMOL/L (ref 135–144)
WBC # BLD: 7.3 K/UL (ref 3.5–11.3)
WBC # BLD: ABNORMAL 10*3/UL

## 2020-01-03 PROCEDURE — 6370000000 HC RX 637 (ALT 250 FOR IP): Performed by: EMERGENCY MEDICINE

## 2020-01-03 PROCEDURE — 6360000004 HC RX CONTRAST MEDICATION: Performed by: EMERGENCY MEDICINE

## 2020-01-03 PROCEDURE — 2580000003 HC RX 258: Performed by: EMERGENCY MEDICINE

## 2020-01-03 PROCEDURE — 70491 CT SOFT TISSUE NECK W/DYE: CPT

## 2020-01-03 PROCEDURE — 80048 BASIC METABOLIC PNL TOTAL CA: CPT

## 2020-01-03 PROCEDURE — 85025 COMPLETE CBC W/AUTO DIFF WBC: CPT

## 2020-01-03 PROCEDURE — 99284 EMERGENCY DEPT VISIT MOD MDM: CPT

## 2020-01-03 RX ORDER — 0.9 % SODIUM CHLORIDE 0.9 %
80 INTRAVENOUS SOLUTION INTRAVENOUS ONCE
Status: COMPLETED | OUTPATIENT
Start: 2020-01-03 | End: 2020-01-03

## 2020-01-03 RX ORDER — HYDROCODONE BITARTRATE AND ACETAMINOPHEN 5; 325 MG/1; MG/1
1 TABLET ORAL EVERY 6 HOURS PRN
Qty: 12 TABLET | Refills: 0 | Status: SHIPPED | OUTPATIENT
Start: 2020-01-03 | End: 2020-01-06

## 2020-01-03 RX ORDER — ATENOLOL 25 MG/1
TABLET ORAL
COMMUNITY
Start: 2019-11-08 | End: 2021-07-02

## 2020-01-03 RX ORDER — SODIUM CHLORIDE 0.9 % (FLUSH) 0.9 %
10 SYRINGE (ML) INJECTION PRN
Status: DISCONTINUED | OUTPATIENT
Start: 2020-01-03 | End: 2020-01-03 | Stop reason: HOSPADM

## 2020-01-03 RX ORDER — CLINDAMYCIN HYDROCHLORIDE 150 MG/1
300 CAPSULE ORAL ONCE
Status: COMPLETED | OUTPATIENT
Start: 2020-01-03 | End: 2020-01-03

## 2020-01-03 RX ORDER — CLINDAMYCIN HYDROCHLORIDE 300 MG/1
300 CAPSULE ORAL 2 TIMES DAILY
Qty: 14 CAPSULE | Refills: 0 | Status: SHIPPED | OUTPATIENT
Start: 2020-01-03 | End: 2020-01-10

## 2020-01-03 RX ADMIN — Medication 10 ML: at 13:13

## 2020-01-03 RX ADMIN — CLINDAMYCIN HYDROCHLORIDE 300 MG: 150 CAPSULE ORAL at 14:10

## 2020-01-03 RX ADMIN — SODIUM CHLORIDE 80 ML: 9 INJECTION, SOLUTION INTRAVENOUS at 13:13

## 2020-01-03 RX ADMIN — IOPAMIDOL 75 ML: 755 INJECTION, SOLUTION INTRAVENOUS at 13:13

## 2020-01-03 ASSESSMENT — ENCOUNTER SYMPTOMS
FACIAL SWELLING: 0
SHORTNESS OF BREATH: 0
EYE PAIN: 0
EYE DISCHARGE: 0
BACK PAIN: 0
ABDOMINAL PAIN: 0
CHEST TIGHTNESS: 0
ABDOMINAL DISTENTION: 0

## 2020-01-03 NOTE — ED PROVIDER NOTES
EMERGENCY DEPARTMENT ENCOUNTER    Pt Name: Jere Santana  MRN: [id number]  [place] 1945  Date of evaluation: 1/3/20  CHIEF COMPLAINT       Chief Complaint   Patient presents with    Neck Pain     swelling right side of neck     HISTORY OF PRESENT ILLNESS   HPI   The patient is a 29-year-old male who presented to the emergency department with a 2-day history of swelling localized to the right side of his neck behind his ear. Patient stated over the last several days has had difficulty turning his head to the right secondary to increased pain. Denied difficulty swallowing denied any trauma or injury. No purulent drainage. No previous history of cancer or thyroid disorder. Patient denied difficulty swallowing no drooling. Patient denies chest pain, shortness of breath, nausea, vomiting, fevers or chills. REVIEW OF SYSTEMS     Review of Systems   Constitutional: Negative for chills, diaphoresis and fever. HENT: Negative for congestion, ear pain and facial swelling. Neck pain   Eyes: Negative for pain, discharge and visual disturbance. Respiratory: Negative for chest tightness and shortness of breath. Cardiovascular: Negative for chest pain and palpitations. Gastrointestinal: Negative for abdominal distention and abdominal pain. Genitourinary: Negative for difficulty urinating and flank pain. Musculoskeletal: Negative for back pain. Skin: Negative for wound. Neurological: Negative for dizziness, light-headedness and headaches.      PASTMEDICAL HISTORY     Past Medical History:   Diagnosis Date    ARF (acute renal failure) ([facility] 11/12/2014    WAS ON DIALYSIS FOR 2 MONTHS    Hypertension      SURGICAL HISTORY       Past Surgical History:   Procedure Laterality Date    BACK SURGERY  1988    BACK SURGERY  2014    L1-2-3    CATHETER REMOVAL      PERMACATHETER FOR DIALYSIS REMOVED,(2/2015)    COLONOSCOPY      CYSTOSCOPY N/A 12/03/14    OTHER SURGICAL HISTORY Right 12/16/2014 perm cath for dialysis. (STOPPED AFTER 2 MONTHS, DONE 2015)    TURP  2015     CURRENT MEDICATIONS       Previous Medications    AMLODIPINE (NORVASC) 10 MG TABLET    TAKE ONE TABLET BY MOUTH ONCE DAILY    ATENOLOL (TENORMIN) 25 MG TABLET    TAKE 1 TABLET BY MOUTH ONCE DAILY FOR 30 DAYS    ATORVASTATIN (LIPITOR) 20 MG TABLET    Take 1 tablet by mouth daily     ALLERGIES     has No Known Allergies. FAMILY HISTORY     He indicated that his mother is alive. He indicated that his father is . He indicated that his sister is alive. He indicated that his brother is alive. SOCIAL HISTORY       Social History     Tobacco Use    Smoking status: Never Smoker    Smokeless tobacco: Never Used   Substance Use Topics    Alcohol use: No    Drug use: No     PHYSICAL EXAM     INITIAL VITALS: BP (!) 193/86   Pulse 75   Temp 98.2 °F (36.8 °C) (Oral)   Resp 14   Ht 5' 9\" (1.753 m)   Wt 201 lb (91.2 kg)   SpO2 100%   BMI 29.68 kg/m²    Physical Exam  Vitals signs and nursing note reviewed. Constitutional:       General: He is not in acute distress. Appearance: He is well-developed. He is not diaphoretic. HENT:      Head: Normocephalic and atraumatic. Comments: Head: At the right posterior neck region there is a mass approximately 4 cm hard not indurated, mobile not warm to touch no overlying erythema  Eyes:      Pupils: Pupils are equal, round, and reactive to light. Neck:      Musculoskeletal: Normal range of motion and neck supple. Cardiovascular:      Rate and Rhythm: Normal rate and regular rhythm. Pulmonary:      Effort: Pulmonary effort is normal.      Breath sounds: Normal breath sounds. Abdominal:      General: Bowel sounds are normal.      Palpations: Abdomen is soft. Musculoskeletal: Normal range of motion. Skin:     General: Skin is warm. Capillary Refill: Capillary refill takes less than 2 seconds.    Neurological:      Mental Status: He is alert and oriented to generate a document that actually reflects the content of the visit, no guarantees can be provided that every mistake has been identified and corrected by editing.                     Karly Gay MD  06[birth year]24[birth year]84 [address] [place] [address] MD Deisy  84/27/57 1681

## 2020-01-03 NOTE — ED NOTES
Advised  By patient  that he developed pain and swelling to area  behind right ear and right side of neck around 4 days ago. Denies  Deann Quails other specific discomfort  Upon arrival patient  Has large mass behind right ear and neck area. Being evaluated by doctor jarad. Lab work and ct x-ray ordered.       Padmini Alberts RN  01/03/20 [id number]

## 2020-01-14 ENCOUNTER — OFFICE VISIT (OUTPATIENT)
Dept: PRIMARY CARE CLINIC | Age: 75
End: 2020-01-14
Payer: MEDICARE

## 2020-01-14 VITALS
BODY MASS INDEX: 30.01 KG/M2 | DIASTOLIC BLOOD PRESSURE: 77 MMHG | HEART RATE: 80 BPM | TEMPERATURE: 98 F | WEIGHT: 203.2 LBS | SYSTOLIC BLOOD PRESSURE: 166 MMHG

## 2020-01-14 PROCEDURE — 99214 OFFICE O/P EST MOD 30 MIN: CPT | Performed by: PHYSICIAN ASSISTANT

## 2020-01-14 PROCEDURE — 1123F ACP DISCUSS/DSCN MKR DOCD: CPT | Performed by: PHYSICIAN ASSISTANT

## 2020-01-14 PROCEDURE — 3017F COLORECTAL CA SCREEN DOC REV: CPT | Performed by: PHYSICIAN ASSISTANT

## 2020-01-14 PROCEDURE — G8484 FLU IMMUNIZE NO ADMIN: HCPCS | Performed by: PHYSICIAN ASSISTANT

## 2020-01-14 PROCEDURE — 4040F PNEUMOC VAC/ADMIN/RCVD: CPT | Performed by: PHYSICIAN ASSISTANT

## 2020-01-14 PROCEDURE — G8417 CALC BMI ABV UP PARAM F/U: HCPCS | Performed by: PHYSICIAN ASSISTANT

## 2020-01-14 PROCEDURE — 1036F TOBACCO NON-USER: CPT | Performed by: PHYSICIAN ASSISTANT

## 2020-01-14 PROCEDURE — G8427 DOCREV CUR MEDS BY ELIG CLIN: HCPCS | Performed by: PHYSICIAN ASSISTANT

## 2020-01-14 RX ORDER — CLINDAMYCIN HYDROCHLORIDE 300 MG/1
300 CAPSULE ORAL 2 TIMES DAILY
Qty: 10 CAPSULE | Refills: 0 | Status: SHIPPED | OUTPATIENT
Start: 2020-01-14 | End: 2020-01-19

## 2020-01-14 RX ORDER — LISINOPRIL 10 MG/1
10 TABLET ORAL DAILY
Qty: 30 TABLET | Refills: 0 | Status: SHIPPED | OUTPATIENT
Start: 2020-01-14 | End: 2020-02-21 | Stop reason: SDUPTHER

## 2020-01-14 RX ORDER — AMLODIPINE BESYLATE 10 MG/1
10 TABLET ORAL DAILY
Qty: 90 TABLET | Refills: 1 | Status: SHIPPED | OUTPATIENT
Start: 2020-01-14 | End: 2020-04-13

## 2020-01-14 RX ORDER — ATORVASTATIN CALCIUM 20 MG/1
20 TABLET, FILM COATED ORAL DAILY
Qty: 90 TABLET | Refills: 1 | Status: SHIPPED | OUTPATIENT
Start: 2020-01-14 | End: 2020-07-16

## 2020-01-14 ASSESSMENT — PATIENT HEALTH QUESTIONNAIRE - PHQ9
1. LITTLE INTEREST OR PLEASURE IN DOING THINGS: 0
SUM OF ALL RESPONSES TO PHQ9 QUESTIONS 1 & 2: 0
2. FEELING DOWN, DEPRESSED OR HOPELESS: 0
SUM OF ALL RESPONSES TO PHQ QUESTIONS 1-9: 0
SUM OF ALL RESPONSES TO PHQ QUESTIONS 1-9: 0

## 2020-01-14 NOTE — PROGRESS NOTES
Visit Information    Have you changed or started any medications since your last visit including any over-the-counter medicines, vitamins, or herbal medicines? no   Are you having any side effects from any of your medications? -  no  Have you stopped taking any of your medications? Is so, why? -  no    Have you seen any other physician or provider since your last visit? No  Have you had any other diagnostic tests since your last visit? No  Have you been seen in the emergency room and/or had an admission to a hospital since we last saw you? No  Have you had your routine dental cleaning in the past 6 months? no    Have you activated your [email or web address] account? If not, what are your barriers?  Yes     Patient Care Team:  Nanci Cabrera PA-C as PCP - General (Physician Assistant)  Nanci Cabrera PA-C as PCP - [facility]  Tiffany Ngo MD as Consulting Physician (Urology)    Medical History Review  Past Medical, Family, and Social History reviewed and does not contribute to the patient presenting condition    Health Maintenance   Topic Date Due    DTaP/Tdap/Td vaccine (1 - Tdap) 04/24/1958    Shingles Vaccine (1 of 2) 04/24/1997    Lipid screen  04/17/2019    Annual Wellness Visit (AWV)  06/19/2019    Flu vaccine (1) 09/01/2019    Potassium monitoring  01/03/2021    Creatinine monitoring  01/03/2021    Colon cancer screen colonoscopy  04/17/2022    Pneumococcal 65+ years Vaccine  Completed    Hepatitis C screen  Completed

## 2020-01-14 NOTE — PROGRESS NOTES
[facility]  [facility]  [address] [place] [address]  Dept: [phone]  Dept Fax: [phone]    Office Progress/Follow Up Note    Date of patient's visit: 1/14/2020    Patient's Name:  Abhijit Mcdowell YOB: 1945            Patient Care Team:  Rashard Burleson PA-C as PCP - General (Physician Assistant)  Rashard Burleson PA-C [facility] PCP [facility]  Cee Bernstein MD as Consulting Physician (Urology)  Darlin Ward MD as Consulting Physician (Nephrology)  ================================================================    REASON FOR VISIT/CHIEF COMPLAINT:  Other (follow up)    HISTORY OF PRESENTING ILLNESS:  History was obtained from: patient. Abhijit Mcdowell is a 69 y.o. is here for follow-up for high blood pressure, HLD, cellulitis. Patient states he is compliant with medications. Patient was seen in ER for neck mass,, CT neck showed subcutaneous soft tissue along the posterior aspect of the right neck with several prominent reactive lymph nodes, most suggestive of a cellulitis, discharged with  narcotic pain medication and antibiotic. Patient states he has finished antibiotic but neck mass is still present. Discussed neck mass, cellulitis, medications in depth with patient, informed patient we he will be prescribed antibiotic take again, informed patient will order US soft tissue if no improvement and resolution after finishing antibiotic, patient voiced understanding. Patient is seen by nephrology for chronic kidney disease, informed patient will obtain records and lab work. Discussed HLD in depth with patient, stable, patient requested medication refill. Patient blood pressure is elevated in office. High concern patient is noncompliant with all blood pressure medication, patient has 31% compliance with amlodipine, 100% compliance with atenolol.   Discussed uncontrolled high blood pressure and risk, Mother     Arthritis Mother     High Cholesterol Mother    [de-identified] / Seretha Caitlyn Mother     Substance Abuse Father         REVIEW OFSYSTEMS:  Review of Systems   Constitutional: Negative for chills and fever. HENT: Negative for congestion. Respiratory: Negative for cough, shortness of breath and wheezing. Cardiovascular: Negative for chest pain. Gastrointestinal: Negative for constipation, diarrhea, nausea and vomiting. Genitourinary: Negative for dysuria, frequency and urgency. Musculoskeletal: Negative for back pain, myalgias and neck pain. Neurological: Negative for headaches. PHYSICAL EXAM:  Vitals:    01/14/20 1200   BP: (!) 166/77   Site: Left Upper Arm   Position: Sitting   Cuff Size: Medium Adult   Pulse: 80   Temp: 98 °F (36.7 °C)   TempSrc: Tympanic   Weight: 203 lb 3.2 oz (92.2 kg)     BP Readings from Last 3 Encounters:   01/14/20 (!) 166/77   01/03/20 (!) 193/86   08/27/18 134/86        Physical Exam  Vitals signs reviewed. Constitutional:       Appearance: Normal appearance. He is well-developed and well-groomed. HENT:      Head: Normocephalic and atraumatic. Mass present. Right Ear: External ear normal.      Left Ear: External ear normal.      Nose: Nose normal.   Eyes:      General: Lids are normal.      Conjunctiva/sclera: Conjunctivae normal.      Pupils: Pupils are equal, round, and reactive to light. Cardiovascular:      Rate and Rhythm: Normal rate and regular rhythm. Heart sounds: Normal heart sounds. Pulmonary:      Effort: Pulmonary effort is normal.      Breath sounds: Normal breath sounds. Abdominal:      Palpations: Abdomen is soft. There is no mass. Tenderness: There is no tenderness. Musculoskeletal:         General: No tenderness. Skin:     General: Skin is warm and dry. Neurological:      Mental Status: He is alert and oriented to person, place, and time. Psychiatric:         Behavior: Behavior is cooperative. visit, the document can still have some mistakes which may not have been identified and corrected by editing.

## 2020-01-15 ENCOUNTER — TELEPHONE (OUTPATIENT)
Dept: PRIMARY CARE CLINIC | Age: 75
End: 2020-01-15

## 2020-01-15 ASSESSMENT — ENCOUNTER SYMPTOMS
SHORTNESS OF BREATH: 0
BACK PAIN: 0
DIARRHEA: 0
NAUSEA: 0
WHEEZING: 0
COUGH: 0
CONSTIPATION: 0
VOMITING: 0

## 2020-02-21 ENCOUNTER — OFFICE VISIT (OUTPATIENT)
Dept: PRIMARY CARE CLINIC | Age: 75
End: 2020-02-21
Payer: MEDICARE

## 2020-02-21 VITALS
WEIGHT: 202.6 LBS | TEMPERATURE: 96.5 F | BODY MASS INDEX: 30.01 KG/M2 | HEIGHT: 69 IN | SYSTOLIC BLOOD PRESSURE: 134 MMHG | DIASTOLIC BLOOD PRESSURE: 79 MMHG | HEART RATE: 95 BPM

## 2020-02-21 PROCEDURE — G8484 FLU IMMUNIZE NO ADMIN: HCPCS | Performed by: PHYSICIAN ASSISTANT

## 2020-02-21 PROCEDURE — 1123F ACP DISCUSS/DSCN MKR DOCD: CPT | Performed by: PHYSICIAN ASSISTANT

## 2020-02-21 PROCEDURE — 3017F COLORECTAL CA SCREEN DOC REV: CPT | Performed by: PHYSICIAN ASSISTANT

## 2020-02-21 PROCEDURE — G0439 PPPS, SUBSEQ VISIT: HCPCS | Performed by: PHYSICIAN ASSISTANT

## 2020-02-21 PROCEDURE — 4040F PNEUMOC VAC/ADMIN/RCVD: CPT | Performed by: PHYSICIAN ASSISTANT

## 2020-02-21 RX ORDER — LISINOPRIL 10 MG/1
10 TABLET ORAL DAILY
Qty: 90 TABLET | Refills: 1 | Status: SHIPPED | OUTPATIENT
Start: 2020-02-21 | End: 2020-08-12 | Stop reason: SDUPTHER

## 2020-02-21 SDOH — ECONOMIC STABILITY: FOOD INSECURITY: WITHIN THE PAST 12 MONTHS, YOU WORRIED THAT YOUR FOOD WOULD RUN OUT BEFORE YOU GOT MONEY TO BUY MORE.: NEVER TRUE

## 2020-02-21 SDOH — ECONOMIC STABILITY: INCOME INSECURITY: HOW HARD IS IT FOR YOU TO PAY FOR THE VERY BASICS LIKE FOOD, HOUSING, MEDICAL CARE, AND HEATING?: NOT HARD AT ALL

## 2020-02-21 SDOH — ECONOMIC STABILITY: FOOD INSECURITY: WITHIN THE PAST 12 MONTHS, THE FOOD YOU BOUGHT JUST DIDN'T LAST AND YOU DIDN'T HAVE MONEY TO GET MORE.: NEVER TRUE

## 2020-02-21 ASSESSMENT — LIFESTYLE VARIABLES: HOW OFTEN DO YOU HAVE A DRINK CONTAINING ALCOHOL: 0

## 2020-02-21 ASSESSMENT — PATIENT HEALTH QUESTIONNAIRE - PHQ9
SUM OF ALL RESPONSES TO PHQ QUESTIONS 1-9: 0
SUM OF ALL RESPONSES TO PHQ QUESTIONS 1-9: 0

## 2020-02-21 NOTE — PATIENT INSTRUCTIONS
Personalized Preventive Plan for Noah Shelton - 2/21/2020  Medicare offers a range of preventive health benefits. Some of the tests and screenings are paid in full while other may be subject to a deductible, co-insurance, and/or copay. Some of these benefits include a comprehensive review of your medical history including lifestyle, illnesses that may run in your family, and various assessments and screenings as appropriate. After reviewing your medical record and screening and assessments performed today your provider may have ordered immunizations, labs, imaging, and/or referrals for you. A list of these orders (if applicable) as well as your Preventive Care list are included within your After Visit Summary for your review. Other Preventive Recommendations:    · A preventive eye exam performed by an eye specialist is recommended every 1-2 years to screen for glaucoma; cataracts, macular degeneration, and other eye disorders. · A preventive dental visit is recommended every 6 months. · Try to get at least 150 minutes of exercise per week or 10,000 steps per day on a pedometer . · Order or download the FREE \"Exercise & Physical Activity: Your Everyday Guide\" from [employer]. Call 0-164.826.2783 or search [employer] online. · You need 3355-7421 mg of calcium and 2626-6007 IU of vitamin D per day. It is possible to meet your calcium requirement with diet alone, but a vitamin D supplement is usually necessary to meet this goal.  · When exposed to the sun, use a sunscreen that protects against both UVA and UVB radiation with an SPF of 30 or greater. Reapply every 2 to 3 hours or after sweating, drying off with a towel, or swimming. · Always wear a seat belt when traveling in a car. Always wear a helmet when riding a bicycle or motorcycle.

## 2020-03-01 ENCOUNTER — TELEPHONE (OUTPATIENT)
Dept: PRIMARY CARE CLINIC | Age: 75
End: 2020-03-01

## 2020-04-13 RX ORDER — AMLODIPINE BESYLATE 10 MG/1
TABLET ORAL
Qty: 90 TABLET | Refills: 0 | Status: SHIPPED | OUTPATIENT
Start: 2020-04-13 | End: 2020-07-16

## 2020-07-16 RX ORDER — ATORVASTATIN CALCIUM 20 MG/1
TABLET, FILM COATED ORAL
Qty: 90 TABLET | Refills: 0 | Status: SHIPPED | OUTPATIENT
Start: 2020-07-16 | End: 2020-10-22

## 2020-07-16 RX ORDER — AMLODIPINE BESYLATE 10 MG/1
TABLET ORAL
Qty: 90 TABLET | Refills: 0 | Status: SHIPPED | OUTPATIENT
Start: 2020-07-16 | End: 2020-10-22

## 2020-07-16 NOTE — TELEPHONE ENCOUNTER
Health Maintenance   Topic Date Due    Lipid screen  04/17/2019    DTaP/Tdap/Td vaccine (1 - Tdap) 01/14/2021 (Originally 4/24/1966)    Shingles Vaccine (1 of 2) 01/14/2021 (Originally 4/24/1997)    Flu vaccine (1) 09/01/2020    Potassium monitoring  01/03/2021    Creatinine monitoring  01/03/2021    Annual Wellness Visit (AWV)  02/21/2021    Colon cancer screen colonoscopy  04/17/2022    Pneumococcal 65+ years Vaccine  Completed    Hepatitis C screen  Completed    Hepatitis A vaccine  Aged Out    Hepatitis B vaccine  Aged Out    Hib vaccine  Aged Out    Meningococcal (ACWY) vaccine  Aged Out             (applicable per patient's age: Cancer Screenings, Depression Screening, Fall Risk Screening, Immunizations)    LDL Cholesterol (mg/dL)   Date Value   04/17/2018 152 (H)     AST (U/L)   Date Value   04/17/2018 38     ALT (U/L)   Date Value   04/17/2018 37     BUN (mg/dL)   Date Value   01/03/2020 28 (H)      (goal A1C is < 7)   (goal LDL is <100) need 30-50% reduction from baseline     BP Readings from Last 3 Encounters:   02/21/20 134/79   01/14/20 (!) 166/77   01/03/20 (!) 193/86    (goal /80)      All Future Testing planned in [facility]:  Lab Frequency Next Occurrence   Lipid Panel Once 09/12/2020   Hepatic Function Panel Once 09/12/2020       Next Visit Date:  Future Appointments   Date Time Provider Gabriela Andrade   7/24/2020  9:00 AM Nicola Gold PA-C [address]            Patient Active Problem List:     Sprain, lumbosacral     Unstable gait     Right lumbosacral radiculopathy     S/P laminectomy     ARF (acute renal failure) (HCC)     Obstructive uropathy     Bladder outlet obstruction     Anemia in CKD (chronic kidney disease) ([address]     Problem with medical care compliance     Hepatic dysfunction     Mixed hyperlipidemia     Essential hypertension     Renal failure, chronic     Normocytic normochromic anemia     Overweight

## 2020-07-27 ENCOUNTER — TELEPHONE (OUTPATIENT)
Dept: PRIMARY CARE CLINIC | Age: 75
End: 2020-07-27

## 2020-07-27 NOTE — TELEPHONE ENCOUNTER
Patient daughter called, stated patient needs to go see an allergist. Patient told her that some foods that he eats his lip swell and last week his throat got jocelyne of tight. They would like to see someone near [facility]. Please advise      Health Maintenance   Topic Date Due    Lipid screen  04/17/2019    DTaP/Tdap/Td vaccine (1 - Tdap) 01/14/2021 (Originally 4/24/1966)    Shingles Vaccine (1 of 2) 01/14/2021 (Originally 4/24/1997)    Flu vaccine (1) 09/01/2020    Potassium monitoring  01/03/2021    Creatinine monitoring  01/03/2021    Annual Wellness Visit (AWV)  02/21/2021    Colon cancer screen colonoscopy  04/17/2022    Pneumococcal 65+ years Vaccine  Completed    Hepatitis C screen  Completed    Hepatitis A vaccine  Aged Out    Hepatitis B vaccine  Aged Out    Hib vaccine  Aged Out    Meningococcal (ACWY) vaccine  Aged Out             (applicable per patient's age: Cancer Screenings, Depression Screening, Fall Risk Screening, Immunizations)    LDL Cholesterol (mg/dL)   Date Value   04/17/2018 152 (H)     AST (U/L)   Date Value   04/17/2018 38     ALT (U/L)   Date Value   04/17/2018 37     BUN (mg/dL)   Date Value   01/03/2020 28 (H)      (goal A1C is < 7)   (goal LDL is <100) need 30-50% reduction from baseline     BP Readings from Last 3 Encounters:   02/21/20 134/79   01/14/20 (!) 166/77   01/03/20 (!) 193/86    (goal /80)      All Future Testing planned in [facility]:  Lab Frequency Next Occurrence   Lipid Panel Once 09/12/2020   Hepatic Function Panel Once 09/12/2020       Next Visit Date:  No future appointments.          Patient Active Problem List:     Sprain, lumbosacral     Unstable gait     Right lumbosacral radiculopathy     S/P laminectomy     ARF (acute renal failure) (HCC)     Obstructive uropathy     Bladder outlet obstruction     Anemia in CKD (chronic kidney disease) ([address]     Problem with medical care compliance     Hepatic dysfunction     Mixed hyperlipidemia     Essential hypertension     Renal failure, chronic     Normocytic normochromic anemia     Overweight

## 2020-08-12 ENCOUNTER — OFFICE VISIT (OUTPATIENT)
Dept: PRIMARY CARE CLINIC | Age: 75
End: 2020-08-12
Payer: MEDICARE

## 2020-08-12 VITALS
BODY MASS INDEX: 29.83 KG/M2 | TEMPERATURE: 98.8 F | WEIGHT: 202 LBS | HEART RATE: 83 BPM | OXYGEN SATURATION: 98 % | DIASTOLIC BLOOD PRESSURE: 77 MMHG | SYSTOLIC BLOOD PRESSURE: 157 MMHG

## 2020-08-12 PROCEDURE — 99214 OFFICE O/P EST MOD 30 MIN: CPT | Performed by: PHYSICIAN ASSISTANT

## 2020-08-12 PROCEDURE — 3017F COLORECTAL CA SCREEN DOC REV: CPT | Performed by: PHYSICIAN ASSISTANT

## 2020-08-12 PROCEDURE — G8417 CALC BMI ABV UP PARAM F/U: HCPCS | Performed by: PHYSICIAN ASSISTANT

## 2020-08-12 PROCEDURE — 4040F PNEUMOC VAC/ADMIN/RCVD: CPT | Performed by: PHYSICIAN ASSISTANT

## 2020-08-12 PROCEDURE — G8427 DOCREV CUR MEDS BY ELIG CLIN: HCPCS | Performed by: PHYSICIAN ASSISTANT

## 2020-08-12 PROCEDURE — 1036F TOBACCO NON-USER: CPT | Performed by: PHYSICIAN ASSISTANT

## 2020-08-12 PROCEDURE — 1123F ACP DISCUSS/DSCN MKR DOCD: CPT | Performed by: PHYSICIAN ASSISTANT

## 2020-08-12 RX ORDER — PREDNISONE 20 MG/1
20 TABLET ORAL DAILY
COMMUNITY
End: 2020-08-12

## 2020-08-12 RX ORDER — LISINOPRIL 10 MG/1
10 TABLET ORAL DAILY
Qty: 90 TABLET | Refills: 0 | Status: SHIPPED | OUTPATIENT
Start: 2020-08-12 | End: 2021-01-12 | Stop reason: SDUPTHER

## 2020-08-12 NOTE — PROGRESS NOTES
Visit Information    Have you changed or started any medications since your last visit including any over-the-counter medicines, vitamins, or herbal medicines? no   Are you having any side effects from any of your medications? -  Yes lip swelling   Have you stopped taking any of your medications? Is so, why? - Atorvastatin     Have you seen any other physician or provider since your last visit? No  Have you had any other diagnostic tests since your last visit? No  Have you been seen in the emergency room and/or had an admission to a hospital since we last saw you? No  Have you had your routine dental cleaning in the past 6 months? no    Have you activated your [employer] account? If not, what are your barriers?  Yes     Patient Care Team:  Antonieta Desir PA-C as PCP - General (Physician Assistant)  Antonieta Desir PA-C as PCP - [facility]  Garett Mohan MD as Consulting Physician (Urology)  James Martinez MD as Consulting Physician (Nephrology)    Medical History Review  Past Medical, Family, and Social History reviewed and does contribute to the patient presenting condition    Health Maintenance   Topic Date Due    Lipid screen  04/17/2019    DTaP/Tdap/Td vaccine (1 - Tdap) 01/14/2021 (Originally 4/24/1966)    Shingles Vaccine (1 of 2) 01/14/2021 (Originally 4/24/1997)    Flu vaccine (1) 09/01/2020    Potassium monitoring  01/03/2021    Creatinine monitoring  01/03/2021    Annual Wellness Visit (AWV)  02/21/2021    Colon cancer screen colonoscopy  04/17/2022    Pneumococcal 65+ years Vaccine  Completed    Hepatitis C screen  Completed    Hepatitis A vaccine  Aged Out    Hepatitis B vaccine  Aged Out    Hib vaccine  Aged Out    Meningococcal (ACWY) vaccine  Aged Out

## 2020-08-12 NOTE — PROGRESS NOTES
Obstructive uropathy    Bladder outlet obstruction    [facility] ([facility]ronic [facility] disease) [facility]    Problem with medical care compliance    Hepatic dysfunction    Mixed hyperlipidemia    Essential hypertension    Renal failure, chronic    Normocytic normochromic anemia    Overweight       Health Maintenance Due   Topic Date Due    Lipid screen  04/17/2019       Allergies   Allergen Reactions    Atorvastatin Swelling         Current Outpatient Medications   Medication Sig Dispense Refill    lisinopril (PRINIVIL;ZESTRIL) 10 MG tablet Take 1 tablet by mouth daily 90 tablet 0    amLODIPine (NORVASC) 10 MG tablet Take 1 tablet by mouth once daily 90 tablet 0    atorvastatin (LIPITOR) 20 MG tablet Take 1 tablet by mouth once daily (Patient not taking: Reported on 8/12/2020) 90 tablet 0    atenolol (TENORMIN) 25 MG tablet TAKE 1 TABLET BY MOUTH ONCE DAILY FOR 30 DAYS       No current facility-administered medications for this visit. Social History     Tobacco Use    Smoking status: Never Smoker    Smokeless tobacco: Never Used   Substance Use Topics    Alcohol use: No    Drug use: No       Family History   Problem Relation Age of Onset    Heart Disease Mother     Arthritis Mother     High Cholesterol Mother    [de-identified] / Stillbirths Mother     Substance Abuse Father         REVIEW OFSYSTEMS:  Review of Systems   Constitutional: Negative for chills and fever. HENT: Negative for congestion. Respiratory: Negative for cough, shortness of breath and wheezing. Cardiovascular: Negative for chest pain. Gastrointestinal: Negative for constipation, diarrhea, nausea and vomiting. Genitourinary: Negative for dysuria, frequency and urgency. Musculoskeletal: Negative for back pain, myalgias and neck pain. Neurological: Negative for headaches.        PHYSICAL EXAM:  Vitals:    08/12/20 1103 08/12/20 1133   BP: (!) 156/71 (!) 157/77   Site: Left Upper Arm    Position: Sitting    Cuff Size: Medium Adult    Pulse: 83    Temp: 98.8 °F (37.1 °C)    SpO2: 98%    Weight: 202 lb (91.6 kg)      BP Readings from Last 3 Encounters:   08/12/20 (!) 157/77   02/21/20 134/79   01/14/20 (!) 166/77        Physical Exam  Vitals signs reviewed. Constitutional:       Appearance: Normal appearance. He is well-developed, well-groomed and overweight. HENT:      Head: Normocephalic and atraumatic. Right Ear: External ear normal.      Left Ear: External ear normal.      Nose: Nose normal.   Eyes:      General: Lids are normal.      Conjunctiva/sclera: Conjunctivae normal.      Pupils: Pupils are equal, round, and reactive to light. Cardiovascular:      Rate and Rhythm: Normal rate and regular rhythm. Heart sounds: Normal heart sounds. Pulmonary:      Effort: Pulmonary effort is normal.      Breath sounds: Normal breath sounds. Abdominal:      Palpations: Abdomen is soft. There is no mass. Tenderness: There is no abdominal tenderness. Musculoskeletal:         General: No tenderness. Skin:     General: Skin is warm and dry. Findings: Rash present. Rash is urticarial.   Neurological:      Mental Status: He is alert and oriented to person, place, and time. Psychiatric:         Behavior: Behavior is cooperative. DIAGNOSTIC FINDINGS:  CBC:  Lab Results   Component Value Date    WBC 7.3 01/03/2020    HGB 14.3 01/03/2020     01/03/2020       BMP:    Lab Results   Component Value Date     01/03/2020    K 4.4 01/03/2020     01/03/2020    CO2 24 01/03/2020    BUN 28 01/03/2020    CREATININE 1.79 01/03/2020    GLUCOSE 98 01/03/2020       HEMOGLOBIN A1C: No results found for: LABA1C    FASTING LIPID PANEL:  Lab Results   Component Value Date    CHOL 219 (H) 04/17/2018    HDL 44 04/17/2018    TRIG 114 04/17/2018       ASSESSMENT AND PLAN:  Huber Dietrich was seen today for hypertension.     Diagnoses and all orders for this visit:    Essential hypertension  -     lisinopril (PRINIVIL;ZESTRIL) 10 MG tablet; Take 1 tablet by mouth daily    Overweight (BMI 25.0-29. 9)    Prostate cancer screening  -     Psa screening; Future      FOLLOW UP AND INSTRUCTIONS:  Return in about 5 months (around 1/12/2021) for Lab Review, HLD, HTN. · Discussed use, benefit, and side effects of prescribed medications. Barriers to medication compliance addressed. All patient questions answered. Pt voiced understanding. · Patient given educational materials - see patient instructions    Electronically signed by Jo Estrada PA-C on 8/12/20 at 11:16 AM EDT    This note is created with the assistance of a speech-recognition program. While intending to generate a document that actually reflects the content of the visit, the document can still have some mistakes which may not have been identified and corrected by editing.

## 2020-08-19 ENCOUNTER — TELEPHONE (OUTPATIENT)
Dept: PRIMARY CARE CLINIC | Age: 75
End: 2020-08-19

## 2020-08-19 ASSESSMENT — ENCOUNTER SYMPTOMS
BACK PAIN: 0
SHORTNESS OF BREATH: 0
NAUSEA: 0
CONSTIPATION: 0
VOMITING: 0
DIARRHEA: 0
COUGH: 0
WHEEZING: 0

## 2020-08-27 LAB
A/G RATIO: NORMAL
ALBUMIN SERPL-MCNC: 4.3 G/DL
ALP BLD-CCNC: 79 U/L
ALT SERPL-CCNC: 46 U/L
AST SERPL-CCNC: 43 U/L
BILIRUB SERPL-MCNC: 0.7 MG/DL (ref 0.1–1.4)
BILIRUBIN DIRECT: 0 MG/DL
BILIRUBIN, INDIRECT: 0.7
CHOLESTEROL, TOTAL: 193 MG/DL
CHOLESTEROL/HDL RATIO: 4.5
GLOBULIN: NORMAL
HDLC SERPL-MCNC: 43 MG/DL (ref 35–70)
LDL CHOLESTEROL CALCULATED: 121 MG/DL (ref 0–160)
NONHDLC SERPL-MCNC: NORMAL MG/DL
PROSTATE SPECIFIC ANTIGEN: 3.67 NG/ML
PROTEIN TOTAL: 7.3 G/DL
TRIGL SERPL-MCNC: 144 MG/DL
VLDLC SERPL CALC-MCNC: 29 MG/DL

## 2020-10-21 NOTE — TELEPHONE ENCOUNTER
Health Maintenance   Topic Date Due    Lipid screen  04/17/2019    Flu vaccine (1) 09/01/2020    DTaP/Tdap/Td vaccine (1 - Tdap) 01/14/2021 (Originally 4/24/1964)    Shingles Vaccine (1 of 2) 01/14/2021 (Originally 4/24/1997)    Potassium monitoring  01/03/2021    Creatinine monitoring  01/03/2021    Annual Wellness Visit (AWV)  02/21/2021    Colon cancer screen colonoscopy  04/17/2022    Pneumococcal 65+ years Vaccine  Completed    Hepatitis C screen  Completed    Hepatitis A vaccine  Aged Out    Hepatitis B vaccine  Aged Out    Hib vaccine  Aged Out    Meningococcal (ACWY) vaccine  Aged Out             (applicable per patient's age: Cancer Screenings, Depression Screening, Fall Risk Screening, Immunizations)    LDL Cholesterol (mg/dL)   Date Value   04/17/2018 152 (H)     AST (U/L)   Date Value   04/17/2018 38     ALT (U/L)   Date Value   04/17/2018 37     BUN (mg/dL)   Date Value   01/03/2020 28 (H)      (goal A1C is < 7)   (goal LDL is <100) need 30-50% reduction from baseline     BP Readings from Last 3 Encounters:   08/12/20 (!) 157/77   02/21/20 134/79   01/14/20 (!) 166/77    (goal /80)      All Future Testing planned in [facility]:  Lab Frequency Next Occurrence   Psa screening Once 11/25/2020       Next Visit Date:  Future Appointments   Date Time Provider Gabriela Andrade   1/12/2021 11:00 AM Rylie Mendez PA-C [address]            Patient Active Problem List:     Sprain, lumbosacral     Unstable gait     Right lumbosacral radiculopathy     S/P laminectomy     ARF (acute renal failure) (HCC)     Obstructive uropathy     Bladder outlet obstruction     Anemia in CKD (chronic kidney disease) ([address]     Problem with medical care compliance     Hepatic dysfunction     Mixed hyperlipidemia     Essential hypertension     Renal failure, chronic     Normocytic normochromic anemia     Overweight

## 2020-10-22 RX ORDER — ATORVASTATIN CALCIUM 20 MG/1
TABLET, FILM COATED ORAL
Qty: 90 TABLET | Refills: 0 | Status: SHIPPED | OUTPATIENT
Start: 2020-10-22 | End: 2021-01-12 | Stop reason: SDUPTHER

## 2020-10-22 RX ORDER — AMLODIPINE BESYLATE 10 MG/1
TABLET ORAL
Qty: 90 TABLET | Refills: 0 | Status: SHIPPED | OUTPATIENT
Start: 2020-10-22 | End: 2021-01-12 | Stop reason: SDUPTHER

## 2020-12-04 DIAGNOSIS — Z12.5 PROSTATE CANCER SCREENING: ICD-10-CM

## 2021-01-12 ENCOUNTER — VIRTUAL VISIT (OUTPATIENT)
Dept: PRIMARY CARE CLINIC | Age: 76
End: 2021-01-12
Payer: MEDICARE

## 2021-01-12 DIAGNOSIS — E66.3 OVERWEIGHT (BMI 25.0-29.9): ICD-10-CM

## 2021-01-12 DIAGNOSIS — N18.30 STAGE 3 CHRONIC KIDNEY DISEASE, UNSPECIFIED WHETHER STAGE 3A OR 3B CKD (HCC): Primary | ICD-10-CM

## 2021-01-12 DIAGNOSIS — E78.2 MIXED HYPERLIPIDEMIA: ICD-10-CM

## 2021-01-12 DIAGNOSIS — Z76.0 MEDICATION REFILL: ICD-10-CM

## 2021-01-12 DIAGNOSIS — N40.0 BENIGN PROSTATIC HYPERPLASIA, UNSPECIFIED WHETHER LOWER URINARY TRACT SYMPTOMS PRESENT: ICD-10-CM

## 2021-01-12 DIAGNOSIS — I10 ESSENTIAL HYPERTENSION: ICD-10-CM

## 2021-01-12 PROCEDURE — 99442 PR PHYS/QHP TELEPHONE EVALUATION 11-20 MIN: CPT | Performed by: PHYSICIAN ASSISTANT

## 2021-01-12 RX ORDER — AMLODIPINE BESYLATE 10 MG/1
10 TABLET ORAL DAILY
Qty: 90 TABLET | Refills: 1 | Status: SHIPPED | OUTPATIENT
Start: 2021-01-12 | End: 2021-07-02 | Stop reason: SDUPTHER

## 2021-01-12 RX ORDER — ATORVASTATIN CALCIUM 20 MG/1
20 TABLET, FILM COATED ORAL DAILY
Qty: 90 TABLET | Refills: 1 | Status: SHIPPED | OUTPATIENT
Start: 2021-01-12 | End: 2021-07-02

## 2021-01-12 RX ORDER — LISINOPRIL 10 MG/1
10 TABLET ORAL DAILY
Qty: 90 TABLET | Refills: 0 | Status: SHIPPED | OUTPATIENT
Start: 2021-01-12 | End: 2021-07-02

## 2021-01-12 ASSESSMENT — PATIENT HEALTH QUESTIONNAIRE - PHQ9
SUM OF ALL RESPONSES TO PHQ QUESTIONS 1-9: 0
SUM OF ALL RESPONSES TO PHQ9 QUESTIONS 1 & 2: 0
SUM OF ALL RESPONSES TO PHQ QUESTIONS 1-9: 0
SUM OF ALL RESPONSES TO PHQ QUESTIONS 1-9: 0

## 2021-01-12 NOTE — PROGRESS NOTES
Visit Information    Have you changed or started any medications since your last visit including any over-the-counter medicines, vitamins, or herbal medicines? no   Are you having any side effects from any of your medications? -  no  Have you stopped taking any of your medications? Is so, why? -  no    Have you seen any other physician or provider since your last visit? No  Have you had any other diagnostic tests since your last visit? No  Have you been seen in the emergency room and/or had an admission to a hospital since we last saw you? No  Have you had your routine dental cleaning in the past 6 months? no    Have you activated your [employer] book account? If not, what are your barriers?  Yes     Patient Care Team:  Jessica Lopez PA-C as PCP - General (Physician Assistant)  Jessica Lopez PA-C as PCP - [facility]  Hector Hernandez MD as Consulting Physician (Urology)  Guerline Wooten MD as Consulting Physician (Nephrology)    Medical History Review  Past Medical, Family, and Social History reviewed and does not contribute to the patient presenting condition    Health Maintenance   Topic Date Due    Flu vaccine (1) 09/01/2020    Potassium monitoring  01/03/2021    Creatinine monitoring  01/03/2021    DTaP/Tdap/Td vaccine (1 - Tdap) 01/14/2021 (Originally 4/24/1966)    Shingles Vaccine (1 of 2) 01/14/2021 (Originally 4/24/1997)    Annual Wellness Visit (AWV)  02/21/2021    Lipid screen  08/27/2021    Colon cancer screen colonoscopy  04/17/2022    Pneumococcal 65+ years Vaccine  Completed    Hepatitis C screen  Completed    Hepatitis A vaccine  Aged Out    Hepatitis B vaccine  Aged Out    Hib vaccine  Aged Out    Meningococcal (ACWY) vaccine  Aged Out

## 2021-01-12 NOTE — PROGRESS NOTES
start lisinopril, instructed patient to contact the PCP office to provide blood pressure readings after obtaining 10 days of readings, patient voiced understanding. Patient reporting weight is stable. Labs reviewed. Health maintenance reviewed, discussed annual Medicare wellness visit in depth with patient, patient scheduled for visit. Medication reviewed and prescribed. HPI    Review of Systems   Constitutional: Negative for chills and fever. HENT: Negative for congestion. Respiratory: Negative for cough, shortness of breath and wheezing. Cardiovascular: Negative for chest pain. Gastrointestinal: Negative for constipation, diarrhea, nausea and vomiting. Genitourinary: Negative for dysuria, frequency and urgency. Musculoskeletal: Negative for back pain, myalgias and neck pain. Neurological: Negative for headaches. Prior to Visit Medications    Medication Sig Taking?  Authorizing Provider   amLODIPine (NORVASC) 10 MG tablet Take 1 tablet by mouth daily Yes Shauna Valadez PA-C   atorvastatin (LIPITOR) 20 MG tablet Take 1 tablet by mouth daily Yes Shauna Valadez PA-C   lisinopril (PRINIVIL;ZESTRIL) 10 MG tablet Take 1 tablet by mouth daily Yes Shauna Valadez PA-C   atenolol (TENORMIN) 25 MG tablet TAKE 1 TABLET BY MOUTH ONCE DAILY FOR 30 DAYS Yes Historical Provider, MD       Social History     Tobacco Use    Smoking status: Never Smoker    Smokeless tobacco: Never Used   Substance Use Topics    Alcohol use: No    Drug use: No        Past Medical History:   Diagnosis Date    ARF (acute renal failure) ([facility] [address] 11/12/2014    WAS ON DIALYSIS FOR 2 MONTHS    Hypertension             CBC:  Lab Results   Component Value Date    WBC 7.3 01/03/2020    HGB 14.3 01/03/2020     01/03/2020       BMP:    Lab Results   Component Value Date     01/03/2020    K 4.4 01/03/2020     01/03/2020    CO2 24 01/03/2020    BUN 28 01/03/2020    CREATININE 1.79 01/03/2020 GLUCOSE 98 01/03/2020       HEMOGLOBIN A1C: No results found for: LABA1C    FASTING LIPID PANEL:  Lab Results   Component Value Date    CHOL 193 08/27/2020    HDL 43 08/27/2020    TRIG 144 08/27/2020         RECORD REVIEW: Previous medical records were reviewed at today's visit. PHYSICAL EXAMINATION:    Vital Signs: (As obtained by patient/caregiver at home)    Patient-Reported Vitals 1/16/2021   Patient-Reported Weight 205          Physical Exam  Constitutional:       General: He is awake. Appearance: He is overweight. Neurological:      Mental Status: He is alert and oriented to person, place, and time. Psychiatric:         Speech: Speech normal.         Behavior: Behavior is cooperative. Thought Content: Thought content normal.         Cognition and Memory: Cognition normal.         Judgment: Judgment normal.           Other pertinent observable physical exam findings:-     RECORD REVIEW: Previous medical records were reviewed at today's visit. The past family, medical and social histories were reviewed and unchanged with the exceptions of what is mentioned in this note. Due to this being a TeleHealth encounter, evaluation of the following organ systems is limited: Vitals/Constitutional/EENT/Resp/CV/GI//MS/Neuro/Skin/Heme-Lymph-Imm. ASSESSMENT/PLAN:  Shiela Donaldson was seen today for follow-up. Diagnoses and all orders for this visit:    Stage 3 chronic kidney disease, unspecified whether stage 3a or 3b CKD    Benign prostatic hyperplasia, unspecified whether lower urinary tract symptoms present    Overweight (BMI 25.0-29. 9)    Medication refill  -     amLODIPine (NORVASC) 10 MG tablet; Take 1 tablet by mouth daily  -     atorvastatin (LIPITOR) 20 MG tablet; Take 1 tablet by mouth daily    Essential hypertension  -     amLODIPine (NORVASC) 10 MG tablet; Take 1 tablet by mouth daily  -     lisinopril (PRINIVIL;ZESTRIL) 10 MG tablet;  Take 1 tablet by mouth daily    Mixed hyperlipidemia  - atorvastatin (LIPITOR) 20 MG tablet; Take 1 tablet by mouth daily        Return in about 5 months (around 6/12/2021) for AMW. An  electronic signature was used to authenticate this note. --Shauna Valadez PA-C on 1/16/2021 at 6:24 PM    This note is created with the assistance of a speech-recognition program. While intending to generate a document that actually reflects the content of the visit, the document can still have some mistakes which may not have been identified and corrected by editing. 9    Pursuant to the emergency declaration under the [facility], [facility] waiver authority and the [employer] Act, this Virtual  Visit was conducted, with patient's consent, to reduce the patient's risk of exposure to COVID-19 and provide continuity of care for an established patient. Services were provided through a video synchronous discussion virtually to substitute for in-person clinic visit.

## 2021-01-12 NOTE — PATIENT INSTRUCTIONS
· Start taking lisinopril 10 mg daily, check blood pressure daily and record, contact PCP office in 7 to 10 days to provide updated blood pressure readings

## 2021-01-16 ENCOUNTER — TELEPHONE (OUTPATIENT)
Dept: PRIMARY CARE CLINIC | Age: 76
End: 2021-01-16

## 2021-01-16 ASSESSMENT — ENCOUNTER SYMPTOMS
NAUSEA: 0
SHORTNESS OF BREATH: 0
COUGH: 0
CONSTIPATION: 0
WHEEZING: 0
DIARRHEA: 0
BACK PAIN: 0
VOMITING: 0

## 2021-01-29 ENCOUNTER — TELEPHONE (OUTPATIENT)
Dept: PRIMARY CARE CLINIC | Age: 76
End: 2021-01-29

## 2021-02-01 ENCOUNTER — TELEPHONE (OUTPATIENT)
Dept: PRIMARY CARE CLINIC | Age: 76
End: 2021-02-01

## 2021-02-02 NOTE — TELEPHONE ENCOUNTER
Lm for pt to call office
Problem with medical care compliance     Hepatic dysfunction     Mixed hyperlipidemia     Essential hypertension     Renal failure, chronic     Normocytic normochromic anemia     Overweight

## 2021-07-02 ENCOUNTER — OFFICE VISIT (OUTPATIENT)
Dept: PRIMARY CARE CLINIC | Age: 76
End: 2021-07-02
Payer: MEDICARE

## 2021-07-02 VITALS
HEART RATE: 74 BPM | BODY MASS INDEX: 30.72 KG/M2 | HEIGHT: 69 IN | OXYGEN SATURATION: 96 % | TEMPERATURE: 98 F | DIASTOLIC BLOOD PRESSURE: 78 MMHG | WEIGHT: 207.4 LBS | SYSTOLIC BLOOD PRESSURE: 154 MMHG

## 2021-07-02 DIAGNOSIS — N40.0 BENIGN PROSTATIC HYPERPLASIA, UNSPECIFIED WHETHER LOWER URINARY TRACT SYMPTOMS PRESENT: ICD-10-CM

## 2021-07-02 DIAGNOSIS — M54.17 RIGHT LUMBOSACRAL RADICULOPATHY: ICD-10-CM

## 2021-07-02 DIAGNOSIS — I10 ESSENTIAL HYPERTENSION: Primary | ICD-10-CM

## 2021-07-02 DIAGNOSIS — Z23 NEED FOR VACCINATION FOR ZOSTER: ICD-10-CM

## 2021-07-02 DIAGNOSIS — Z76.0 MEDICATION REFILL: ICD-10-CM

## 2021-07-02 DIAGNOSIS — E66.9 OBESITY (BMI 30.0-34.9): ICD-10-CM

## 2021-07-02 DIAGNOSIS — E78.2 MIXED HYPERLIPIDEMIA: ICD-10-CM

## 2021-07-02 PROCEDURE — 4040F PNEUMOC VAC/ADMIN/RCVD: CPT | Performed by: NURSE PRACTITIONER

## 2021-07-02 PROCEDURE — G8427 DOCREV CUR MEDS BY ELIG CLIN: HCPCS | Performed by: NURSE PRACTITIONER

## 2021-07-02 PROCEDURE — G8417 CALC BMI ABV UP PARAM F/U: HCPCS | Performed by: NURSE PRACTITIONER

## 2021-07-02 PROCEDURE — 99214 OFFICE O/P EST MOD 30 MIN: CPT | Performed by: NURSE PRACTITIONER

## 2021-07-02 PROCEDURE — 1123F ACP DISCUSS/DSCN MKR DOCD: CPT | Performed by: NURSE PRACTITIONER

## 2021-07-02 PROCEDURE — 1036F TOBACCO NON-USER: CPT | Performed by: NURSE PRACTITIONER

## 2021-07-02 PROCEDURE — 3017F COLORECTAL CA SCREEN DOC REV: CPT | Performed by: NURSE PRACTITIONER

## 2021-07-02 RX ORDER — AMLODIPINE BESYLATE 10 MG/1
10 TABLET ORAL DAILY
Qty: 90 TABLET | Refills: 1 | Status: SHIPPED | OUTPATIENT
Start: 2021-07-02 | End: 2022-05-10 | Stop reason: SDUPTHER

## 2021-07-02 RX ORDER — ZOSTER VACCINE RECOMBINANT, ADJUVANTED 50 MCG/0.5
0.5 KIT INTRAMUSCULAR SEE ADMIN INSTRUCTIONS
Qty: 0.5 ML | Refills: 0 | Status: SHIPPED | OUTPATIENT
Start: 2021-07-02 | End: 2021-12-29

## 2021-07-02 RX ORDER — LOSARTAN POTASSIUM 25 MG/1
12.5 TABLET ORAL DAILY
Qty: 15 TABLET | Refills: 0 | Status: SHIPPED | OUTPATIENT
Start: 2021-07-02 | End: 2022-01-03

## 2021-07-02 RX ORDER — ATORVASTATIN CALCIUM 20 MG/1
20 TABLET, FILM COATED ORAL DAILY
Qty: 90 TABLET | Refills: 1 | Status: SHIPPED | OUTPATIENT
Start: 2021-07-02 | End: 2021-11-10

## 2021-07-02 SDOH — ECONOMIC STABILITY: FOOD INSECURITY: WITHIN THE PAST 12 MONTHS, THE FOOD YOU BOUGHT JUST DIDN'T LAST AND YOU DIDN'T HAVE MONEY TO GET MORE.: NEVER TRUE

## 2021-07-02 SDOH — ECONOMIC STABILITY: FOOD INSECURITY: WITHIN THE PAST 12 MONTHS, YOU WORRIED THAT YOUR FOOD WOULD RUN OUT BEFORE YOU GOT MONEY TO BUY MORE.: NEVER TRUE

## 2021-07-02 ASSESSMENT — ENCOUNTER SYMPTOMS
ORTHOPNEA: 0
SHORTNESS OF BREATH: 0
NAUSEA: 0
VOMITING: 0
ANAL BLEEDING: 0
TROUBLE SWALLOWING: 0
BACK PAIN: 0
CONSTIPATION: 0
WHEEZING: 0
COUGH: 0
ABDOMINAL PAIN: 0
DIARRHEA: 0

## 2021-07-02 ASSESSMENT — PATIENT HEALTH QUESTIONNAIRE - PHQ9
SUM OF ALL RESPONSES TO PHQ QUESTIONS 1-9: 0
2. FEELING DOWN, DEPRESSED OR HOPELESS: 0
SUM OF ALL RESPONSES TO PHQ9 QUESTIONS 1 & 2: 0
SUM OF ALL RESPONSES TO PHQ QUESTIONS 1-9: 0
1. LITTLE INTEREST OR PLEASURE IN DOING THINGS: 0
SUM OF ALL RESPONSES TO PHQ QUESTIONS 1-9: 0

## 2021-07-02 ASSESSMENT — SOCIAL DETERMINANTS OF HEALTH (SDOH): HOW HARD IS IT FOR YOU TO PAY FOR THE VERY BASICS LIKE FOOD, HOUSING, MEDICAL CARE, AND HEATING?: NOT HARD AT ALL

## 2021-07-02 NOTE — PROGRESS NOTES
[facility]  [id number] [facility] [address]  Dept: [phone]  Dept Fax: [phone]    Patient Care Team:  CASSANDRA Del Angel - CNP as PCP - General (Family Medicine)  Zohra Ugalde MD as Consulting Physician (Urology)  Sixto Andino MD as Consulting Physician (Nephrology)    2021    Kiel Cardoza (:  1945) roxana 78 y.o. male, here for evaluation of the following medical concerns:   Chief Complaint   Patient presents with   Gil Mack Doctor     HTN    Medication Refill         Kiel Cardoza is a 81-year-old male that presents today to [facility] care. He is up-to-date with primary care visits. He admits to history of hypertension, kidney disease, and urinary retention. Follows with Dr Morris Ivy for [place] retention, self caths 4x a day. He denies any abdominal pain, hematuria, or dysuria. Also with Dr Kerry Sweet for his CKD, due back the  at [facility]. No medication changes since last visit    Exercises 5 days a week, drinks 8 bottles of water a day  Hypertension  This is a chronic problem. The problem is uncontrolled. Pertinent negatives include no chest pain, headaches, neck pain, orthopnea, palpitations, peripheral edema or shortness of breath. Risk factors for coronary artery disease include male gender. Past treatments include calcium channel blockers. There are no compliance problems. There is no history of a thyroid problem. Review of Systems   Constitutional: Negative for chills and fever. HENT: Negative for congestion, drooling and trouble swallowing. Respiratory: Negative for cough, shortness of breath and wheezing. Cardiovascular: Negative for chest pain, palpitations, orthopnea and leg swelling. Gastrointestinal: Negative for abdominal pain, anal bleeding, constipation, diarrhea, nausea and vomiting.    Genitourinary: Negative for decreased urine volume, difficulty urinating, dysuria, frequency and urgency. Musculoskeletal: Negative for back pain, myalgias and neck pain. Neurological: Negative for dizziness, tremors, syncope and headaches. Prior to Visit Medications    Medication Sig Taking? Authorizing Provider   atorvastatin (LIPITOR) 20 MG tablet Take 1 tablet by mouth daily Yes CASSANDRA Fernández CNP   amLODIPine (NORVASC) 10 MG tablet Take 1 tablet by mouth daily Yes CASSANDRA Fernández CNP   zoster recombinant adjuvanted vaccine [facility]) 50 MCG/0.5ML SUSR injection Inject 0.5 mLs into the muscle See Admin Instructions 1 dose now and repeat in 2-6 months Yes CASSANDRA Fernández CNP        Allergies   Allergen Reactions    Lisinopril Swelling       Past Medical History:   Diagnosis Date    ARF (acute renal failure) ([facility] [address] 11/12/2014    WAS ON DIALYSIS FOR 2 MONTHS    Hypertension        Past Surgical History:   Procedure Laterality Date    BACK SURGERY  1988    BACK SURGERY  2014    L1-2-3    CATHETER REMOVAL      PERMACATHETER FOR DIALYSIS REMOVED,(2/2015)    COLONOSCOPY      CYSTOSCOPY N/A 12/03/14    OTHER SURGICAL HISTORY Right 12/16/2014    perm cath for dialysis.  (STOPPED AFTER 2 MONTHS, DONE 1/2015)    TURP  03/23/2015       Social History     Socioeconomic History    Marital status:      Spouse name: Not on file    Number of children: 3    Years of education: Not on file    Highest education level: Not on file   Occupational History    Not on file   Tobacco Use    Smoking status: Never Smoker    Smokeless tobacco: Never Used   Substance and Sexual Activity    Alcohol use: No    Drug use: No    Sexual activity: Not on file   Other Topics Concern    Not on file   Social History Narrative    Not on file     Social Determinants of Health     Financial Resource Strain: Low Risk     Difficulty of Paying Living Expenses: Not hard at all   Food Insecurity: No Food Insecurity    Worried About Running Out of Food in the Last Year: Never true    [address] in the Last Year: Never true   Transportation Needs:     Lack of Transportation (Medical):  Lack of Transportation (Non-Medical):    Physical Activity:     Days of Exercise per Week:     Minutes of Exercise per Session:    Stress:     Feeling of Stress :    Social Connections:     Frequency of Communication with Friends and Family:     Frequency of Social Gatherings with Friends and Family:     Attends [employer] Services:     Active Member of Clubs or Organizations:     Attends Club or Organization Meetings:     Marital Status:    Intimate Partner Violence:     Fear of Current or Ex-Partner:     Emotionally Abused:     Physically Abused:     Sexually Abused:         Family History   Problem Relation Age of Onset    Heart Disease Mother     Arthritis Mother     High Cholesterol Mother    Jovita Jj / Magali Parra Mother     Substance Abuse Father        Vitals:    07/02/21 1040   BP: (!) 158/78   Site: Right Upper Arm   Position: Sitting   Cuff Size: Large Adult   Pulse: 79   Temp: 98 °F (36.7 °C)   TempSrc: Temporal   SpO2: 96%   Weight: 207 lb 6.4 oz (94.1 kg)   Height: 5' 9\" (1.753 m)     Estimated body mass index is 30.63 kg/m² as calculated from the following:    Height as of this encounter: 5' 9\" (1.753 m). Weight as of this encounter: 207 lb 6.4 oz (94.1 kg). Physical Exam  Vitals reviewed. Constitutional:       Appearance: Normal appearance. He is well-developed, well-groomed and overweight. HENT:      Head: Normocephalic and atraumatic. Right Ear: External ear normal.      Left Ear: External ear normal.      Nose: Nose normal.   Eyes:      General: Lids are normal.      Conjunctiva/sclera: Conjunctivae normal.      Pupils: Pupils are equal, round, and reactive to light. Cardiovascular:      Rate and Rhythm: Normal rate and regular rhythm. Heart sounds: Normal heart sounds.    Pulmonary:      Effort: Pulmonary effort is normal.      Breath sounds: Normal breath sounds. Abdominal:      Palpations: Abdomen is soft. There is no mass. Tenderness: There is no abdominal tenderness. Musculoskeletal:      Right lower leg: No edema. Left lower leg: No edema. Skin:     General: Skin is warm and dry. Neurological:      Mental Status: He is alert and oriented to person, place, and time. Psychiatric:         Behavior: Behavior is cooperative. ASSESSMENT     Diagnosis Orders   1. Essential hypertension  Comprehensive Metabolic Panel, Fasting    amLODIPine (NORVASC) 10 MG tablet    Lipid, Fasting   2. Benign prostatic hyperplasia, unspecified whether lower urinary tract symptoms present     3. Obesity (BMI 30.0-34.9)     4. Right lumbosacral radiculopathy     5. Mixed hyperlipidemia  atorvastatin (LIPITOR) 20 MG tablet    Lipid, Fasting   6. Medication refill  atorvastatin (LIPITOR) 20 MG tablet    amLODIPine (NORVASC) 10 MG tablet   7. Need for vaccination for zoster  zoster recombinant adjuvanted vaccine [facility]) 50 MCG/0.5ML SUSR injection          PLAN:  Orders Placed This Encounter   Medications    atorvastatin (LIPITOR) 20 MG tablet     Sig: Take 1 tablet by mouth daily     Dispense:  90 tablet     Refill:  1    amLODIPine (NORVASC) 10 MG tablet     Sig: Take 1 tablet by mouth daily     Dispense:  90 tablet     Refill:  1    zoster recombinant adjuvanted vaccine (SHINGRIX) 50 MCG/0.5ML SUSR injection     Sig: Inject 0.5 mLs into the muscle See Admin Instructions 1 dose now and repeat in 2-6 months     Dispense:  0.5 mL     Refill:  0         1. Theron Mccarthy is doing well without complaints or concerns today. Compliant with follow-up with specialist.  2. Blood pressure does remain elevated for the last 2 visits. Previously had angioedema with lisinopril. Taking maximum dose of amlodipine at this time, will start low-dose losartan once daily. Patient verbalized understanding.   3. Health maintenance as ordered above. FOLLOW UP AND INSTRUCTIONS:  Return in about 6 months (around 1/2/2022) for HTN. · Marielos Zayas received counseling on the following healthy behaviors:nutrition and medication adherence    · Discussed use, benefit, and side effects of prescribed medications. Barriers to  medication compliance addressed. All patient questions answered. Pt voiced  understanding. · Patient given educational materials - see patient instructions    · Patient advised to contact scheduling offices for any referrals or imaging orders  placed today if they have not been contacted in 48 hours. Return in about 6 months (around 1/2/2022) for HTN. An  electronic signature was used to authenticate this note. --CASSANDRA Bledsoe CNP on 7/2/2021 at 11:15 AM  Visit Information    Have you changed or started any medications since your last visit including any over-the-counter medicines, vitamins, or herbal medicines? no   Are you having any side effects from any of your medications? -  no  Have you stopped taking any of your medications? Is so, why? -  no    Have you seen any other physician or provider since your last visit? No  Have you had any other diagnostic tests since your last visit? No  Have you been seen in the emergency room and/or had an admission to a hospital since we last saw you? No  Have you had your routine dental cleaning in the past 6 months? no    Have you activated your [employer] account? If not, what are your barriers?  Yes     Patient Care Team:  CASSANDRA Bledsoe CNP as PCP - General (Family Medicine)  Grzegorz Brantley MD as Consulting Physician (Urology)  Yari Saldana MD as Consulting Physician (Nephrology)    Medical History Review  Past Medical, Family, and Social History reviewed and does not contribute to the patient presenting condition    Health Maintenance   Topic Date Due    DTaP/Tdap/Td vaccine (1 - Tdap) Never done    Shingles Vaccine (1 of 2) Never done   [employer] Visit (AWV)  Never done    Potassium monitoring  01/03/2021    Creatinine monitoring  01/03/2021    Lipid screen  08/27/2021    Flu vaccine (1) 09/01/2021    Colon cancer screen colonoscopy  04/17/2022    Pneumococcal 65+ years Vaccine  Completed    COVID-19 Vaccine  Completed    Hepatitis C screen  Completed    Hepatitis A vaccine  Aged Out    Hepatitis B vaccine  Aged Out    Hib vaccine  Aged Out    Meningococcal (ACWY) vaccine  Aged Out

## 2021-11-09 LAB
ALBUMIN SERPL-MCNC: 4.4 G/DL
ALP BLD-CCNC: 67 U/L
ALT SERPL-CCNC: 38 U/L
AST SERPL-CCNC: 52 U/L
BASOPHILS ABSOLUTE: 0.02 /ΜL
BASOPHILS RELATIVE PERCENT: 0.3 %
BILIRUB SERPL-MCNC: 0.7 MG/DL (ref 0.1–1.4)
BUN BLDV-MCNC: 29 MG/DL
CALCIUM SERPL-MCNC: 9.8 MG/DL
CHLORIDE BLD-SCNC: 105 MMOL/L
CHOLESTEROL, FASTING: 303
CO2: 26 MMOL/L
CREAT SERPL-MCNC: 1.76 MG/DL
EOSINOPHILS ABSOLUTE: 0.38 /ΜL
EOSINOPHILS RELATIVE PERCENT: 6.2 %
GLUCOSE FASTING: 100 MG/DL
HCT VFR BLD CALC: 40.7 % (ref 41–53)
HDLC SERPL-MCNC: 45 MG/DL (ref 35–70)
HEMOGLOBIN: 13.5 G/DL (ref 13.5–17.5)
LDL CHOLESTEROL CALCULATED: 231 MG/DL (ref 0–160)
LYMPHOCYTES ABSOLUTE: 2.05 /ΜL
LYMPHOCYTES RELATIVE PERCENT: 33.7 %
MCH RBC QN AUTO: 27.7 PG
MCHC RBC AUTO-ENTMCNC: 33.2 G/DL
MCV RBC AUTO: 83.4 FL
MONOCYTES ABSOLUTE: 0.54 /ΜL
MONOCYTES RELATIVE PERCENT: 8.9 %
NEUTROPHILS ABSOLUTE: 3.08 /ΜL
NEUTROPHILS RELATIVE PERCENT: ABNORMAL
PDW BLD-RTO: 43.8 %
PLATELET # BLD: 280 K/ΜL
PMV BLD AUTO: ABNORMAL FL
POTASSIUM SERPL-SCNC: 4.4 MMOL/L
RBC # BLD: 4.88 10^6/ΜL
SODIUM BLD-SCNC: 137 MMOL/L
TOTAL PROTEIN: 7.8 G/DL (ref 6.4–8.2)
TRIGLYCERIDE, FASTING: 136
WBC # BLD: 6.09 10^3/ML

## 2021-11-10 ENCOUNTER — TELEPHONE (OUTPATIENT)
Dept: PRIMARY CARE CLINIC | Age: 76
End: 2021-11-10

## 2021-11-10 DIAGNOSIS — I10 ESSENTIAL HYPERTENSION: ICD-10-CM

## 2021-11-10 DIAGNOSIS — E78.2 MIXED HYPERLIPIDEMIA: ICD-10-CM

## 2021-11-10 DIAGNOSIS — Z76.0 MEDICATION REFILL: ICD-10-CM

## 2021-11-10 RX ORDER — ATORVASTATIN CALCIUM 40 MG/1
40 TABLET, FILM COATED ORAL DAILY
Qty: 90 TABLET | Refills: 0 | Status: SHIPPED | OUTPATIENT
Start: 2021-11-10 | End: 2022-02-09

## 2021-11-10 NOTE — TELEPHONE ENCOUNTER
Spoke with patient and gave results of labs completed, patient understands and also states that he has not been taking lipitor on a regular basis, stated that he will start and also informed of medication sent to pharmacy.

## 2021-11-10 NOTE — TELEPHONE ENCOUNTER
Please notify Nicole Avalos that his labs all are back, his cholesterol is very elevated, over 300. I am going to increase his dosing of atorvastatin from 20 mg a day to 40 mg a day. Please verify that Nicole Avalos is taking his current dose of Lipitor daily.

## 2022-01-03 ENCOUNTER — OFFICE VISIT (OUTPATIENT)
Dept: PRIMARY CARE CLINIC | Age: 77
End: 2022-01-03
Payer: MEDICARE

## 2022-01-03 VITALS
WEIGHT: 205 LBS | DIASTOLIC BLOOD PRESSURE: 77 MMHG | BODY MASS INDEX: 30.27 KG/M2 | TEMPERATURE: 98.1 F | HEART RATE: 66 BPM | SYSTOLIC BLOOD PRESSURE: 138 MMHG | OXYGEN SATURATION: 100 %

## 2022-01-03 DIAGNOSIS — N18.30 STAGE 3 CHRONIC KIDNEY DISEASE, UNSPECIFIED WHETHER STAGE 3A OR 3B CKD (HCC): ICD-10-CM

## 2022-01-03 DIAGNOSIS — E78.2 MIXED HYPERLIPIDEMIA: ICD-10-CM

## 2022-01-03 DIAGNOSIS — I10 ESSENTIAL HYPERTENSION: Primary | ICD-10-CM

## 2022-01-03 PROCEDURE — G8484 FLU IMMUNIZE NO ADMIN: HCPCS | Performed by: NURSE PRACTITIONER

## 2022-01-03 PROCEDURE — 1123F ACP DISCUSS/DSCN MKR DOCD: CPT | Performed by: NURSE PRACTITIONER

## 2022-01-03 PROCEDURE — 1036F TOBACCO NON-USER: CPT | Performed by: NURSE PRACTITIONER

## 2022-01-03 PROCEDURE — G8417 CALC BMI ABV UP PARAM F/U: HCPCS | Performed by: NURSE PRACTITIONER

## 2022-01-03 PROCEDURE — 3017F COLORECTAL CA SCREEN DOC REV: CPT | Performed by: NURSE PRACTITIONER

## 2022-01-03 PROCEDURE — 4040F PNEUMOC VAC/ADMIN/RCVD: CPT | Performed by: NURSE PRACTITIONER

## 2022-01-03 PROCEDURE — 99214 OFFICE O/P EST MOD 30 MIN: CPT | Performed by: NURSE PRACTITIONER

## 2022-01-03 PROCEDURE — G8427 DOCREV CUR MEDS BY ELIG CLIN: HCPCS | Performed by: NURSE PRACTITIONER

## 2022-01-03 RX ORDER — LOSARTAN POTASSIUM 25 MG/1
12.5 TABLET ORAL DAILY
Qty: 45 TABLET | Refills: 0 | Status: SHIPPED | OUTPATIENT
Start: 2022-01-03 | End: 2022-04-07

## 2022-01-03 RX ORDER — ATENOLOL 25 MG/1
TABLET ORAL
COMMUNITY
Start: 2021-12-04 | End: 2022-05-10 | Stop reason: SDUPTHER

## 2022-01-03 ASSESSMENT — ENCOUNTER SYMPTOMS
BLURRED VISION: 0
ORTHOPNEA: 0
DIARRHEA: 0
BLOOD IN STOOL: 0
NAUSEA: 0
TROUBLE SWALLOWING: 0
ABDOMINAL PAIN: 0
WHEEZING: 0
BACK PAIN: 0
ANAL BLEEDING: 0
COUGH: 0
VOMITING: 0
CONSTIPATION: 0
SHORTNESS OF BREATH: 0

## 2022-01-03 NOTE — PROGRESS NOTES
[facility]  [id number] [facility] [address]  Dept: [phone]  Dept Fax: [phone]    Patient Care Team:  CASSANDRA Jacobson CNP as PCP - General (Family Medicine)  CASSANDRA Jacobson CNP as PCP - [facility] Empaneled Provider  Sabra Pascual MD as Consulting Physician (Urology)  Roddy Dietrich MD as Consulting Physician (Nephrology)    1/3/2022     Jazmine Perkins (:  )FB a 78 y.o. male, here for evaluation of the following medical concerns:   Chief Complaint   Patient presents with    6 Month Follow-Up     HTN     Jazmine Perkins is a 37-year-old male here today for routine evaluation of hypertension. Last seen in office on 2021. Due to elevated blood pressure, losartan was added at July visit alongside amlodipine. Taking daily medications. Follows with Dr Mary Mosher at [facility] for Kidney disease. Prescribed atenolol. Last seen in November  [place] does not recall taking losartan or being told to stop the medication. Also following with Urology, Dr Christian Valladares. Hypertension  This is a chronic problem. The problem is uncontrolled. Pertinent negatives include no blurred vision, chest pain, headaches, neck pain, orthopnea, palpitations, peripheral edema or shortness of breath. Risk factors for coronary artery disease include male gender. Past treatments include calcium channel blockers. There are no compliance problems. There is no history of a thyroid problem. .    Review of Systems   Constitutional: Negative for chills and fever. HENT: Negative for congestion, drooling and trouble swallowing. Eyes: Negative for blurred vision. Respiratory: Negative for cough, shortness of breath and wheezing. Cardiovascular: Negative for chest pain, palpitations, orthopnea and leg swelling.    Gastrointestinal: Negative for abdominal pain, anal bleeding, blood in stool, constipation, diarrhea, nausea and vomiting. Genitourinary: Negative for decreased urine volume, difficulty urinating, dysuria, frequency, hematuria and urgency. Musculoskeletal: Negative for back pain, myalgias and neck pain. Skin: Negative for rash and wound. Neurological: Negative for dizziness, tremors, syncope and headaches. Prior to Visit Medications    Medication Sig Taking? Authorizing Provider   atenolol (TENORMIN) 25 MG tablet TAKE 1 TABLET BY MOUTH ONCE DAILY Yes Historical Provider, MD   losartan (COZAAR) 25 MG tablet Take 0.5 tablets by mouth daily Yes CASSANDRA Frazier CNP   atorvastatin (LIPITOR) 40 MG tablet Take 1 tablet by mouth daily Yes CASSANDRA Frazier CNP   amLODIPine (NORVASC) 10 MG tablet Take 1 tablet by mouth daily Yes CASSANDRA Frazier CNP        Social History     Tobacco Use    Smoking status: Never Smoker    Smokeless tobacco: Never Used   Substance Use Topics    Alcohol use: No        Vitals:    01/03/22 1038   BP: (!) 171/80   Site: Right Upper Arm   Position: Sitting   Pulse: 70   Temp: 98.1 °F (36.7 °C)   TempSrc: Temporal   SpO2: 100%   Weight: 205 lb (93 kg)     Estimated body mass index is 30.27 kg/m² as calculated from the following:    Height as of 7/2/21: 5' 9\" (1.753 m). Weight as of this encounter: 205 lb (93 kg). DIAGNOSTIC FINDINGS:  CBC:  Lab Results   Component Value Date    WBC 6.09 11/09/2021    HGB 13.5 11/09/2021     11/09/2021       BMP:    Lab Results   Component Value Date     11/09/2021    K 4.4 11/09/2021     11/09/2021    CO2 26 11/09/2021    BUN 29 11/09/2021    CREATININE 1.76 11/09/2021    GLUCOSE 98 01/03/2020       HEMOGLOBIN A1C: No results found for: LABA1C    FASTING LIPID PANEL:  Lab Results   Component Value Date    CHOL 193 08/27/2020    HDL 45 11/09/2021    TRIG 144 08/27/2020       Physical Exam  Vitals reviewed. Constitutional:       Appearance: Normal appearance.  He is well-developed, well-groomed and overweight. HENT:      Head: Normocephalic and atraumatic. Right Ear: External ear normal.      Left Ear: External ear normal.      Nose: Nose normal.   Eyes:      General: Lids are normal.      Conjunctiva/sclera: Conjunctivae normal.      Pupils: Pupils are equal, round, and reactive to light. Cardiovascular:      Rate and Rhythm: Normal rate and regular rhythm. Heart sounds: Normal heart sounds. Pulmonary:      Effort: Pulmonary effort is normal.      Breath sounds: Normal breath sounds. Abdominal:      Palpations: Abdomen is soft. There is no mass. Tenderness: There is no abdominal tenderness. Musculoskeletal:      Right lower leg: No edema. Left lower leg: No edema. Skin:     General: Skin is warm and dry. Neurological:      Mental Status: He is alert and oriented to person, place, and time. Psychiatric:         Behavior: Behavior is cooperative. ASSESSMENT     Diagnosis Orders   1. Essential hypertension  losartan (COZAAR) 25 MG tablet   2. Stage 3 chronic kidney disease, unspecified whether stage 3a or 3b CKD ([facility] [address]     3. Mixed hyperlipidemia            PLAN:  Orders Placed This Encounter   Medications    losartan (COZAAR) 25 MG tablet     Sig: Take 0.5 tablets by mouth daily     Dispense:  45 tablet     Refill:  0         1. I spoke with Albina Chamorro at [facility], confirmed patient never initiated or picked up prescription for losartan. Continue with amlodipine and atenolol, will resend losartan to pharmacy due to elevated blood pressure at this time. Creatinine remains at baseline at 1.7-1.8. FOLLOW UP AND INSTRUCTIONS:  Return in about 4 months (around 5/3/2022) for HTN. · Wesleyfaith Aguiar received counseling on the following healthy behaviors:medication adherence    · Discussed use, benefit, and side effects of prescribed medications. Barriers to  medication compliance addressed. All patient questions answered.   Pt  verbalized understanding of all instructions given. · Patient given educational materials - see patient instructions      · Patient advised to contact scheduling offices for any referrals or imaging orders  placed today if they have not been contacted in 48 hours. Return in about 4 months (around 5/3/2022) for HTN. An electronic signature was used to authenticate this note. --CASSANDRA Braun CNP on 1/3/2022 at 11:06 AM  Visit Information    Have you changed or started any medications since your last visit including any over-the-counter medicines, vitamins, or herbal medicines? no   Are you having any side effects from any of your medications? -  no  Have you stopped taking any of your medications? Is so, why? -  no    Have you seen any other physician or provider since your last visit? Yes - Records Obtained  Have you had any other diagnostic tests since your last visit? No  Have you been seen in the emergency room and/or had an admission to a hospital since we last saw you? No  Have you had your routine dental cleaning in the past 6 months? no    Have you activated your [employer] account? If not, what are your barriers?  Yes     Patient Care Team:  CASSANDRA Braun CNP as PCP - General (Family Medicine)  CASSANDRA Braun CNP as PCP - [facility]  Julius Love MD as Consulting Physician (Urology)  Zuleyka Lindsey MD as Consulting Physician (Nephrology)    Medical History Review  Past Medical, Family, and Social History reviewed and does not contribute to the patient presenting condition    Health Maintenance   Topic Date Due    DTaP/Tdap/Td vaccine (1 - Tdap) Never done    Shingles Vaccine (1 of 2) Never done    Annual Wellness Visit (AWV)  02/21/2021    Colon cancer screen colonoscopy  04/17/2022    Depression Screen  07/02/2022    Lipid screen  11/09/2022    Potassium monitoring  11/09/2022    Creatinine monitoring  11/09/2022    Flu vaccine  Completed    Pneumococcal 65+ years Vaccine Completed    COVID-19 Vaccine  Completed    Hepatitis C screen  Completed    Hepatitis A vaccine  Aged Out    Hepatitis B vaccine  Aged Out    Hib vaccine  Aged Out    Meningococcal (ACWY) vaccine  Aged Out

## 2022-02-08 DIAGNOSIS — Z76.0 MEDICATION REFILL: ICD-10-CM

## 2022-02-08 DIAGNOSIS — E78.2 MIXED HYPERLIPIDEMIA: ICD-10-CM

## 2022-02-08 NOTE — TELEPHONE ENCOUNTER
Health Maintenance   Topic Date Due    DTaP/Tdap/Td vaccine (1 - Tdap) Never done    Shingles Vaccine (1 of 2) Never done   [employer] Visit (AWV)  02/21/2021    Colon cancer screen colonoscopy  04/17/2022    Depression Screen  07/02/2022    Lipid screen  11/09/2022    Potassium monitoring  11/09/2022    Creatinine monitoring  11/09/2022    Flu vaccine  Completed    Pneumococcal 65+ years Vaccine  Completed    COVID-19 Vaccine  Completed    Hepatitis C screen  Completed    Hepatitis A vaccine  Aged Out    Hepatitis B vaccine  Aged Out    Hib vaccine  Aged Out    Meningococcal (ACWY) vaccine  Aged Out             (applicable per patient's age: Cancer Screenings, Depression Screening, Fall Risk Screening, Immunizations)    LDL Cholesterol (mg/dL)   Date Value   04/17/2018 152 (H)     LDL Calculated (mg/dL)   Date Value   11/09/2021 231 (A)     AST (U/L)   Date Value   11/09/2021 52     ALT (U/L)   Date Value   11/09/2021 38     BUN (mg/dL)   Date Value   11/09/2021 29      (goal A1C is < 7)   (goal LDL is <100) need 30-50% reduction from baseline     BP Readings from Last 3 Encounters:   01/03/22 138/77   07/02/21 (!) 154/78   08/12/20 (!) 157/77    (goal /80)      All Future Testing planned in [facility]:  Lab Frequency Next Occurrence       Next Visit Date:  Future Appointments   Date Time Provider Gabriela Andrade   5/3/2022 10:30 AM CASSANDRA Salguero - CNP [facility]            Patient Active Problem List:     Sprain, lumbosacral     Unstable gait     Right lumbosacral radiculopathy     S/P laminectomy     ARF (acute renal failure) ([address]     Obstructive uropathy     Bladder outlet obstruction     Anemia in CKD (chronic kidney disease) ([address]     Problem with medical care compliance     Hepatic dysfunction     Mixed hyperlipidemia     Essential hypertension     Renal failure, chronic     Normocytic normochromic anemia     Overweight     Stage 3 chronic kidney disease,

## 2022-02-09 RX ORDER — ATORVASTATIN CALCIUM 40 MG/1
TABLET, FILM COATED ORAL
Qty: 90 TABLET | Refills: 0 | Status: SHIPPED | OUTPATIENT
Start: 2022-02-09 | End: 2022-05-10 | Stop reason: SDUPTHER

## 2022-04-06 DIAGNOSIS — I10 ESSENTIAL HYPERTENSION: ICD-10-CM

## 2022-04-07 RX ORDER — LOSARTAN POTASSIUM 25 MG/1
TABLET ORAL
Qty: 45 TABLET | Refills: 0 | Status: SHIPPED | OUTPATIENT
Start: 2022-04-07 | End: 2022-07-18

## 2022-04-07 NOTE — TELEPHONE ENCOUNTER
Health Maintenance   Topic Date Due    DTaP/Tdap/Td vaccine (1 - Tdap) Never done    Shingles Vaccine (1 of 2) Never done   [employer] Visit (AWV)  02/21/2021    Colorectal Cancer Screen  04/17/2022    Depression Screen  07/02/2022    Lipid screen  11/09/2022    Potassium monitoring  11/09/2022    Creatinine monitoring  11/09/2022    Flu vaccine  Completed    Pneumococcal 65+ years Vaccine  Completed    COVID-19 Vaccine  Completed    Hepatitis C screen  Completed    Hepatitis A vaccine  Aged Out    Hepatitis B vaccine  Aged Out    Hib vaccine  Aged Out    Meningococcal (ACWY) vaccine  Aged Out             (applicable per patient's age: Cancer Screenings, Depression Screening, Fall Risk Screening, Immunizations)    LDL Cholesterol (mg/dL)   Date Value   04/17/2018 152 (H)     LDL Calculated (mg/dL)   Date Value   11/09/2021 231 (A)     AST (U/L)   Date Value   11/09/2021 52     ALT (U/L)   Date Value   11/09/2021 38     BUN (mg/dL)   Date Value   11/09/2021 29      (goal A1C is < 7)   (goal LDL is <100) need 30-50% reduction from baseline     BP Readings from Last 3 Encounters:   01/03/22 138/77   07/02/21 (!) 154/78   08/12/20 (!) 157/77    (goal /80)      All Future Testing planned in [facility]:  Lab Frequency Next Occurrence       Next Visit Date:  Future Appointments   Date Time Provider Gabriela Andrade   5/3/2022 10:30 AM Maryjane Briones APRN - CNP [facility]            Patient Active Problem List:     Sprain, lumbosacral     Unstable gait     Right lumbosacral radiculopathy     S/P laminectomy     ARF (acute renal failure) ([address]     Obstructive uropathy     Bladder outlet obstruction     Anemia in CKD (chronic kidney disease) ([address]     Problem with medical care compliance     Hepatic dysfunction     Mixed hyperlipidemia     Essential hypertension     Renal failure, chronic     Normocytic normochromic anemia     Overweight     Stage 3 chronic kidney disease, unspecified whether stage 3a or 3b CKD ([facility]

## 2022-05-10 ENCOUNTER — OFFICE VISIT (OUTPATIENT)
Dept: PRIMARY CARE CLINIC | Age: 77
End: 2022-05-10
Payer: MEDICARE

## 2022-05-10 VITALS
WEIGHT: 201 LBS | SYSTOLIC BLOOD PRESSURE: 146 MMHG | TEMPERATURE: 97.7 F | BODY MASS INDEX: 29.68 KG/M2 | HEART RATE: 80 BPM | OXYGEN SATURATION: 100 % | DIASTOLIC BLOOD PRESSURE: 82 MMHG

## 2022-05-10 DIAGNOSIS — E78.2 MIXED HYPERLIPIDEMIA: ICD-10-CM

## 2022-05-10 DIAGNOSIS — I10 ESSENTIAL HYPERTENSION: Primary | ICD-10-CM

## 2022-05-10 DIAGNOSIS — Z12.11 COLON CANCER SCREENING: ICD-10-CM

## 2022-05-10 DIAGNOSIS — Z76.0 MEDICATION REFILL: ICD-10-CM

## 2022-05-10 PROCEDURE — G8417 CALC BMI ABV UP PARAM F/U: HCPCS | Performed by: NURSE PRACTITIONER

## 2022-05-10 PROCEDURE — G8427 DOCREV CUR MEDS BY ELIG CLIN: HCPCS | Performed by: NURSE PRACTITIONER

## 2022-05-10 PROCEDURE — 1123F ACP DISCUSS/DSCN MKR DOCD: CPT | Performed by: NURSE PRACTITIONER

## 2022-05-10 PROCEDURE — 4040F PNEUMOC VAC/ADMIN/RCVD: CPT | Performed by: NURSE PRACTITIONER

## 2022-05-10 PROCEDURE — 99213 OFFICE O/P EST LOW 20 MIN: CPT | Performed by: NURSE PRACTITIONER

## 2022-05-10 PROCEDURE — 1036F TOBACCO NON-USER: CPT | Performed by: NURSE PRACTITIONER

## 2022-05-10 PROCEDURE — 3017F COLORECTAL CA SCREEN DOC REV: CPT | Performed by: NURSE PRACTITIONER

## 2022-05-10 RX ORDER — ATENOLOL 25 MG/1
TABLET ORAL
Qty: 90 TABLET | Refills: 1 | Status: SHIPPED | OUTPATIENT
Start: 2022-05-10

## 2022-05-10 RX ORDER — AMLODIPINE BESYLATE 10 MG/1
10 TABLET ORAL DAILY
Qty: 90 TABLET | Refills: 1 | Status: SHIPPED | OUTPATIENT
Start: 2022-05-10

## 2022-05-10 RX ORDER — ATORVASTATIN CALCIUM 40 MG/1
TABLET, FILM COATED ORAL
Qty: 90 TABLET | Refills: 1 | Status: SHIPPED | OUTPATIENT
Start: 2022-05-10

## 2022-05-10 ASSESSMENT — ENCOUNTER SYMPTOMS
ANAL BLEEDING: 0
SHORTNESS OF BREATH: 0
TROUBLE SWALLOWING: 0
BACK PAIN: 0
BLOOD IN STOOL: 0
ABDOMINAL PAIN: 0
BLURRED VISION: 0
NAUSEA: 0
WHEEZING: 0
COUGH: 0
VOMITING: 0
DIARRHEA: 0
CONSTIPATION: 0
ORTHOPNEA: 0

## 2022-05-10 ASSESSMENT — PATIENT HEALTH QUESTIONNAIRE - PHQ9
1. LITTLE INTEREST OR PLEASURE IN DOING THINGS: 0
SUM OF ALL RESPONSES TO PHQ QUESTIONS 1-9: 0
SUM OF ALL RESPONSES TO PHQ QUESTIONS 1-9: 0
SUM OF ALL RESPONSES TO PHQ9 QUESTIONS 1 & 2: 0
SUM OF ALL RESPONSES TO PHQ QUESTIONS 1-9: 0
2. FEELING DOWN, DEPRESSED OR HOPELESS: 0
SUM OF ALL RESPONSES TO PHQ QUESTIONS 1-9: 0

## 2022-05-10 NOTE — PROGRESS NOTES
[facility]  [id number] [facility] [address]  Dept: [phone]  Dept Fax: [phone]    Patient Care Team:  CASSANDRA Kwok CNP as PCP - General (Family Medicine)  CASSANDRA Kwok CNP as PCP - [facility] Empaneled Provider  Kelli Mendoza MD as Consulting Physician (Urology)  Zackery Martin MD as Consulting Physician (Nephrology)    5/10/2022     Maria C Arriaga (:  )KR a 78 y.o. male, here for evaluation of the following medical concerns:   Chief Complaint   Patient presents with    Hypertension     4M Follow-up       Taking daily medications. Follows with Dr Hafsa Dickinson at [facility] for Kidney disease. Prescribed atenolol but states he has been out of it for the last few months. He is unsure of his most recent visit    Also following with Urology, Dr Massiel Feldman. Next appt , goes annualy. Hypertension  This is a chronic problem. The problem is uncontrolled. Pertinent negatives include no blurred vision, chest pain, headaches, neck pain, orthopnea, palpitations, peripheral edema or shortness of breath. Risk factors for coronary artery disease include male gender. Past treatments include calcium channel blockers. There are no compliance problems. There is no history of a thyroid problem. .    Review of Systems   Constitutional: Negative for chills and fever. HENT: Negative for congestion, drooling and trouble swallowing. Eyes: Negative for blurred vision. Respiratory: Negative for cough, shortness of breath and wheezing. Cardiovascular: Negative for chest pain, palpitations, orthopnea and leg swelling. Gastrointestinal: Negative for abdominal pain, anal bleeding, blood in stool, constipation, diarrhea, nausea and vomiting. Genitourinary: Negative for decreased urine volume, difficulty urinating, dysuria, frequency, hematuria and urgency.    Musculoskeletal: Negative for back pain, myalgias and neck pain. Skin: Negative for rash and wound. Neurological: Negative for dizziness, tremors, syncope and headaches. Prior to Visit Medications    Medication Sig Taking? Authorizing Provider   atenolol (TENORMIN) 25 MG tablet TAKE 1 TABLET BY MOUTH ONCE DAILY Yes Salvador Render, APRN - CNP   atorvastatin (LIPITOR) 40 MG tablet Take 1 tablet by mouth once daily Yes Salvador Render, APRN - CNP   amLODIPine (NORVASC) 10 MG tablet Take 1 tablet by mouth daily Yes Salvador Render, APRN - CNP   losartan (COZAAR) 25 MG tablet Take 1/2 (one-half) tablet by mouth once daily Yes Salvador Render, APRN - CNP        Social History     Tobacco Use    Smoking status: Never Smoker    Smokeless tobacco: Never Used   Substance Use Topics    Alcohol use: No        Vitals:    05/10/22 0840 05/10/22 0906   BP: (!) 153/77 (!) 146/82   Site: Left Upper Arm    Position: Sitting    Pulse: 86 80   Temp: 97.7 °F (36.5 °C)    TempSrc: Temporal    SpO2: 100%    Weight: 201 lb (91.2 kg)      Estimated body mass index is 29.68 kg/m² as calculated from the following:    Height as of 7/2/21: 5' 9\" (1.753 m). Weight as of this encounter: 201 lb (91.2 kg). DIAGNOSTIC FINDINGS:  CBC:  Lab Results   Component Value Date    WBC 6.09 11/09/2021    HGB 13.5 11/09/2021     11/09/2021       BMP:    Lab Results   Component Value Date     11/09/2021    K 4.4 11/09/2021     11/09/2021    CO2 26 11/09/2021    BUN 29 11/09/2021    CREATININE 1.76 11/09/2021    GLUCOSE 98 01/03/2020       HEMOGLOBIN A1C: No results found for: LABA1C    FASTING LIPID PANEL:  Lab Results   Component Value Date    CHOL 193 08/27/2020    HDL 45 11/09/2021    TRIG 144 08/27/2020       Physical Exam  Vitals reviewed. Constitutional:       Appearance: Normal appearance. He is well-developed, well-groomed and overweight. HENT:      Head: Normocephalic and atraumatic.       Right Ear: External ear normal.      Left Ear: External ear normal.      Nose: Nose normal.   Eyes:      General: Lids are normal.      Conjunctiva/sclera: Conjunctivae normal.      Pupils: Pupils are equal, round, and reactive to light. Cardiovascular:      Rate and Rhythm: Normal rate and regular rhythm. Heart sounds: Normal heart sounds. Pulmonary:      Effort: Pulmonary effort is normal.      Breath sounds: Normal breath sounds. Abdominal:      Palpations: Abdomen is soft. There is no mass. Tenderness: There is no abdominal tenderness. Musculoskeletal:      Right lower leg: No edema. Left lower leg: No edema. Skin:     General: Skin is warm and dry. Neurological:      Mental Status: He is alert and oriented to person, place, and time. Psychiatric:         Behavior: Behavior is cooperative. ASSESSMENT     Diagnosis Orders   1. Essential hypertension  atenolol (TENORMIN) 25 MG tablet    amLODIPine (NORVASC) 10 MG tablet   2. Mixed hyperlipidemia  atorvastatin (LIPITOR) 40 MG tablet   3. Medication refill  atorvastatin (LIPITOR) 40 MG tablet    amLODIPine (NORVASC) 10 MG tablet   4. Colon cancer screening  POCT Fecal Immunochemical Test (FIT)          PLAN:  Orders Placed This Encounter   Medications    atenolol (TENORMIN) 25 MG tablet     Sig: TAKE 1 TABLET BY MOUTH ONCE DAILY     Dispense:  90 tablet     Refill:  1    atorvastatin (LIPITOR) 40 MG tablet     Sig: Take 1 tablet by mouth once daily     Dispense:  90 tablet     Refill:  1    amLODIPine (NORVASC) 10 MG tablet     Sig: Take 1 tablet by mouth daily     Dispense:  90 tablet     Refill:  1         1. Atenolol refilled today, continue with other current medications  2. Due for colon cancer screening, FIT testing provided today  3. Will call to request most recent nephrology note as well as most recent labs from [address] [address]:  Return in about 6 months (around 11/10/2022) for AMW visit.     · Chanelle Stevenson received counseling on the following healthy behaviors:medication adherence    · Discussed use, benefit, and side effects of prescribed medications. Barriers to  medication compliance addressed. All patient questions answered. Pt  verbalized understanding of all instructions given. · Patient given educational materials - see patient instructions      · Patient advised to contact scheduling offices for any referrals or imaging orders  placed today if they have not been contacted in 48 hours. Return in about 6 months (around 11/10/2022) for AMW visit. An electronic signature was used to authenticate this note. --Cleola Canavan, APRN - CNP on 5/10/2022 at 9:14 AM  Visit Information    Have you changed or started any medications since your last visit including any over-the-counter medicines, vitamins, or herbal medicines? no   Are you having any side effects from any of your medications? -  no  Have you stopped taking any of your medications? Is so, why? -  no    Have you seen any other physician or provider since your last visit? No  Have you had any other diagnostic tests since your last visit? No  Have you been seen in the emergency room and/or had an admission to a hospital since we last saw you? No  Have you had your routine dental cleaning in the past 6 months? no    Have you activated your [employer] account? If not, what are your barriers?  Yes     Patient Care Team:  Cleola Canavan, APRN - CNP as PCP - General (Family Medicine)  Cleola Canavan, APRN - CNP as PCP - [facility]  Vick Santana MD as Consulting Physician (Urology)  Sreekanth Booker MD as Consulting Physician (Nephrology)    Medical History Review  Past Medical, Family, and Social History reviewed and does not contribute to the patient presenting condition    Health Maintenance   Topic Date Due    DTaP/Tdap/Td vaccine (1 - Tdap) Never done    Shingles vaccine (1 of 2) Never done   [employer] Visit (AWV)  02/21/2021    Colorectal Cancer Screen  04/17/2022  Depression Screen  07/02/2022    Lipids  11/09/2022    Potassium  11/09/2022    Creatinine  11/09/2022    Flu vaccine  Completed    Pneumococcal 65+ years Vaccine  Completed    COVID-19 Vaccine  Completed    Hepatitis C screen  Completed    Hepatitis A vaccine  Aged Out    Hepatitis B vaccine  Aged Out    Hib vaccine  Aged Out    Meningococcal (ACWY) vaccine  Aged Out

## 2022-07-16 DIAGNOSIS — I10 ESSENTIAL HYPERTENSION: ICD-10-CM

## 2022-07-16 NOTE — TELEPHONE ENCOUNTER
Health Maintenance   Topic Date Due    DTaP/Tdap/Td vaccine (1 - Tdap) Never done    Shingles vaccine (1 of 2) Never done    Annual Wellness Visit (AWV)  02/21/2021    Colorectal Cancer Screen  04/17/2022    Flu vaccine (1) 09/01/2022    Lipids  11/09/2022    Depression Screen  05/10/2023    Pneumococcal 65+ years Vaccine  Completed    COVID-19 Vaccine  Completed    Hepatitis C screen  Completed    Hepatitis A vaccine  Aged Out    Hepatitis B vaccine  Aged Out    Hib vaccine  Aged Out    Meningococcal (ACWY) vaccine  Aged Out             (applicable per patient's age: Cancer Screenings, Depression Screening, Fall Risk Screening, Immunizations)    LDL Cholesterol (mg/dL)   Date Value   04/17/2018 152 (H)     LDL Calculated (mg/dL)   Date Value   11/09/2021 231 (A)     AST (U/L)   Date Value   11/09/2021 52     ALT (U/L)   Date Value   11/09/2021 38     BUN (mg/dL)   Date Value   11/09/2021 29      (goal A1C is < 7)   (goal LDL is <100) need 30-50% reduction from baseline     BP Readings from Last 3 Encounters:   05/10/22 (!) 146/82   01/03/22 138/77   07/02/21 (!) 154/78    (goal /80)      All Future Testing planned in [facility]:  Lab Frequency Next Occurrence   POCT Fecal Immunochemical Test (FIT) Once 05/10/2022       Next Visit Date:  Future Appointments   Date Time Provider Gabriela Andrade   11/10/2022  8:30 AM CASSANDRA Sharp - CNP [facility]            Patient Active Problem List:     Sprain, lumbosacral     Unstable gait     Right lumbosacral radiculopathy     S/P laminectomy     ARF (acute renal failure) (HCC)     Obstructive uropathy     Bladder outlet obstruction     Anemia in CKD (chronic kidney disease) ([address]     Problem with medical care compliance     Hepatic dysfunction     Mixed hyperlipidemia     Essential hypertension     Renal failure, chronic     Normocytic normochromic anemia     Overweight     Stage 3 chronic kidney disease, unspecified whether stage 3a or 3b CKD ([address]

## 2022-07-18 RX ORDER — LOSARTAN POTASSIUM 25 MG/1
TABLET ORAL
Qty: 45 TABLET | Refills: 0 | Status: SHIPPED | OUTPATIENT
Start: 2022-07-18 | End: 2022-10-18

## 2022-10-18 DIAGNOSIS — I10 ESSENTIAL HYPERTENSION: ICD-10-CM

## 2022-10-18 RX ORDER — LOSARTAN POTASSIUM 25 MG/1
TABLET ORAL
Qty: 45 TABLET | Refills: 0 | Status: SHIPPED | OUTPATIENT
Start: 2022-10-18

## 2022-10-18 NOTE — TELEPHONE ENCOUNTER
Health Maintenance   Topic Date Due    DTaP/Tdap/Td vaccine (1 - Tdap) Never done    Shingles vaccine (1 of 2) Never done    Annual Wellness Visit (AWV)  02/21/2021    Colorectal Cancer Screen  04/17/2022    Flu vaccine (1) 08/01/2022    Lipids  11/09/2022    Depression Screen  05/10/2023    Pneumococcal 65+ years Vaccine  Completed    COVID-19 Vaccine  Completed    Hepatitis C screen  Completed    Hepatitis A vaccine  Aged Out    Hib vaccine  Aged Out    Meningococcal (ACWY) vaccine  Aged Out             (applicable per patient's age: Cancer Screenings, Depression Screening, Fall Risk Screening, Immunizations)    LDL Cholesterol (mg/dL)   Date Value   04/17/2018 152 (H)     LDL Calculated (mg/dL)   Date Value   11/09/2021 231 (A)     AST (U/L)   Date Value   11/09/2021 52     ALT (U/L)   Date Value   11/09/2021 38     BUN (mg/dL)   Date Value   11/09/2021 29      (goal A1C is < 7)   (goal LDL is <100) need 30-50% reduction from baseline     BP Readings from Last 3 Encounters:   05/10/22 (!) 146/82   01/03/22 138/77   07/02/21 (!) 154/78    (goal /80)      All Future Testing planned in [facility]:  Lab Frequency Next Occurrence   POCT Fecal Immunochemical Test (FIT) Once 05/10/2022       Next Visit Date:  Future Appointments   Date Time Provider Gabriela Andrade   11/10/2022  8:30 AM CASSANDRA Moyer - CNP [facility]            Patient Active Problem List:     Sprain, lumbosacral     Unstable gait     Right lumbosacral radiculopathy     S/P laminectomy     ARF (acute renal failure) (HCC)     Obstructive uropathy     Bladder outlet obstruction     Anemia in CKD (chronic kidney disease) ([facility] [address]     Problem with medical care compliance     Hepatic dysfunction     Mixed hyperlipidemia     Essential hypertension     Renal failure, chronic     Normocytic normochromic anemia     Overweight     Stage 3 chronic kidney disease, unspecified whether stage 3a or 3b CKD ([facility] [address]

## 2022-11-10 ENCOUNTER — OFFICE VISIT (OUTPATIENT)
Dept: PRIMARY CARE CLINIC | Age: 77
End: 2022-11-10
Payer: MEDICARE

## 2022-11-10 VITALS
WEIGHT: 201 LBS | HEART RATE: 74 BPM | SYSTOLIC BLOOD PRESSURE: 162 MMHG | BODY MASS INDEX: 29.77 KG/M2 | TEMPERATURE: 98.1 F | DIASTOLIC BLOOD PRESSURE: 73 MMHG | HEIGHT: 69 IN | OXYGEN SATURATION: 99 %

## 2022-11-10 DIAGNOSIS — Z12.11 COLON CANCER SCREENING: ICD-10-CM

## 2022-11-10 DIAGNOSIS — I10 ESSENTIAL HYPERTENSION: ICD-10-CM

## 2022-11-10 DIAGNOSIS — Z23 NEED FOR INFLUENZA VACCINATION: ICD-10-CM

## 2022-11-10 DIAGNOSIS — E78.2 MIXED HYPERLIPIDEMIA: ICD-10-CM

## 2022-11-10 DIAGNOSIS — Z76.0 MEDICATION REFILL: ICD-10-CM

## 2022-11-10 DIAGNOSIS — Z00.00 MEDICARE ANNUAL WELLNESS VISIT, SUBSEQUENT: Primary | ICD-10-CM

## 2022-11-10 DIAGNOSIS — Z71.89 ACP (ADVANCE CARE PLANNING): ICD-10-CM

## 2022-11-10 DIAGNOSIS — Z23 NEED FOR VACCINATION: ICD-10-CM

## 2022-11-10 PROCEDURE — 3074F SYST BP LT 130 MM HG: CPT | Performed by: NURSE PRACTITIONER

## 2022-11-10 PROCEDURE — 3017F COLORECTAL CA SCREEN DOC REV: CPT | Performed by: NURSE PRACTITIONER

## 2022-11-10 PROCEDURE — 1036F TOBACCO NON-USER: CPT | Performed by: NURSE PRACTITIONER

## 2022-11-10 PROCEDURE — G0008 ADMIN INFLUENZA VIRUS VAC: HCPCS | Performed by: NURSE PRACTITIONER

## 2022-11-10 PROCEDURE — G8427 DOCREV CUR MEDS BY ELIG CLIN: HCPCS | Performed by: NURSE PRACTITIONER

## 2022-11-10 PROCEDURE — G8484 FLU IMMUNIZE NO ADMIN: HCPCS | Performed by: NURSE PRACTITIONER

## 2022-11-10 PROCEDURE — 90694 VACC AIIV4 NO PRSRV 0.5ML IM: CPT | Performed by: NURSE PRACTITIONER

## 2022-11-10 PROCEDURE — G0439 PPPS, SUBSEQ VISIT: HCPCS | Performed by: NURSE PRACTITIONER

## 2022-11-10 PROCEDURE — 1123F ACP DISCUSS/DSCN MKR DOCD: CPT | Performed by: NURSE PRACTITIONER

## 2022-11-10 PROCEDURE — 99214 OFFICE O/P EST MOD 30 MIN: CPT | Performed by: NURSE PRACTITIONER

## 2022-11-10 PROCEDURE — 3078F DIAST BP <80 MM HG: CPT | Performed by: NURSE PRACTITIONER

## 2022-11-10 PROCEDURE — G8417 CALC BMI ABV UP PARAM F/U: HCPCS | Performed by: NURSE PRACTITIONER

## 2022-11-10 RX ORDER — ATENOLOL 25 MG/1
TABLET ORAL
Qty: 90 TABLET | Refills: 1 | Status: SHIPPED | OUTPATIENT
Start: 2022-11-10

## 2022-11-10 RX ORDER — LOSARTAN POTASSIUM 25 MG/1
25 TABLET ORAL DAILY
Qty: 90 TABLET | Refills: 1 | Status: SHIPPED | OUTPATIENT
Start: 2022-11-10 | End: 2023-02-08

## 2022-11-10 RX ORDER — AMLODIPINE BESYLATE 10 MG/1
10 TABLET ORAL DAILY
Qty: 90 TABLET | Refills: 1 | Status: SHIPPED | OUTPATIENT
Start: 2022-11-10

## 2022-11-10 RX ORDER — ZOSTER VACCINE RECOMBINANT, ADJUVANTED 50 MCG/0.5
0.5 KIT INTRAMUSCULAR SEE ADMIN INSTRUCTIONS
Qty: 0.5 ML | Refills: 0 | Status: SHIPPED | OUTPATIENT
Start: 2022-11-10 | End: 2023-05-09

## 2022-11-10 RX ORDER — ATORVASTATIN CALCIUM 40 MG/1
TABLET, FILM COATED ORAL
Qty: 90 TABLET | Refills: 1 | Status: SHIPPED | OUTPATIENT
Start: 2022-11-10

## 2022-11-10 SDOH — ECONOMIC STABILITY: FOOD INSECURITY: WITHIN THE PAST 12 MONTHS, YOU WORRIED THAT YOUR FOOD WOULD RUN OUT BEFORE YOU GOT MONEY TO BUY MORE.: NEVER TRUE

## 2022-11-10 SDOH — ECONOMIC STABILITY: TRANSPORTATION INSECURITY
IN THE PAST 12 MONTHS, HAS LACK OF TRANSPORTATION KEPT YOU FROM MEETINGS, WORK, OR FROM GETTING THINGS NEEDED FOR DAILY LIVING?: NO

## 2022-11-10 SDOH — ECONOMIC STABILITY: FOOD INSECURITY: WITHIN THE PAST 12 MONTHS, THE FOOD YOU BOUGHT JUST DIDN'T LAST AND YOU DIDN'T HAVE MONEY TO GET MORE.: NEVER TRUE

## 2022-11-10 SDOH — ECONOMIC STABILITY: TRANSPORTATION INSECURITY
IN THE PAST 12 MONTHS, HAS THE LACK OF TRANSPORTATION KEPT YOU FROM MEDICAL APPOINTMENTS OR FROM GETTING MEDICATIONS?: NO

## 2022-11-10 ASSESSMENT — PATIENT HEALTH QUESTIONNAIRE - PHQ9
SUM OF ALL RESPONSES TO PHQ QUESTIONS 1-9: 0
SUM OF ALL RESPONSES TO PHQ QUESTIONS 1-9: 0
SUM OF ALL RESPONSES TO PHQ9 QUESTIONS 1 & 2: 0
SUM OF ALL RESPONSES TO PHQ QUESTIONS 1-9: 0
1. LITTLE INTEREST OR PLEASURE IN DOING THINGS: 0
2. FEELING DOWN, DEPRESSED OR HOPELESS: 0
SUM OF ALL RESPONSES TO PHQ QUESTIONS 1-9: 0

## 2022-11-10 ASSESSMENT — ENCOUNTER SYMPTOMS
COUGH: 0
BLURRED VISION: 0
TROUBLE SWALLOWING: 0
CONSTIPATION: 0
NAUSEA: 0
VOMITING: 0
DIARRHEA: 0
BACK PAIN: 0
WHEEZING: 0
BLOOD IN STOOL: 0
ABDOMINAL PAIN: 0
ANAL BLEEDING: 0
SHORTNESS OF BREATH: 0
ORTHOPNEA: 0

## 2022-11-10 ASSESSMENT — LIFESTYLE VARIABLES
HOW OFTEN DO YOU HAVE A DRINK CONTAINING ALCOHOL: NEVER
HOW MANY STANDARD DRINKS CONTAINING ALCOHOL DO YOU HAVE ON A TYPICAL DAY: PATIENT DOES NOT DRINK

## 2022-11-10 ASSESSMENT — SOCIAL DETERMINANTS OF HEALTH (SDOH): HOW HARD IS IT FOR YOU TO PAY FOR THE VERY BASICS LIKE FOOD, HOUSING, MEDICAL CARE, AND HEATING?: NOT HARD AT ALL

## 2022-11-10 NOTE — PROGRESS NOTES
[facility]  [id number] [facility] [address]  Dept: [phone]  Dept Fax: [phone]    Patient Care Team:  CASSADNRA Kinney CNP as PCP - General (Family Medicine)  CASSANDRA Kinney CNP as PCP - [facility]  Yoselyn Whitmore MD as Consulting Physician (Urology)  Ronnie Elizabeth MD as Consulting Physician (Nephrology)    11/10/2022     Meño Jane (:  )BC a 78 y.o. male, here for evaluation of the following medical concerns:   Chief Complaint   Patient presents with    Medicare AWV    Flu Vaccine       Taking daily medications. Follows with Dr Leatha South at [facility] for Kidney disease. Also following with Urology, Dr Es Conley. Goes annualy. Hypertension  This is a chronic problem. The problem is uncontrolled. Pertinent negatives include no blurred vision, chest pain, headaches, neck pain, orthopnea, palpitations, peripheral edema or shortness of breath. Risk factors for coronary artery disease include male gender. Past treatments include calcium channel blockers. There are no compliance problems. There is no history of a thyroid problem. .    Review of Systems   Constitutional:  Negative for chills and fever. HENT:  Negative for congestion, drooling and trouble swallowing. Eyes:  Negative for blurred vision. Respiratory:  Negative for cough, shortness of breath and wheezing. Cardiovascular:  Negative for chest pain, palpitations, orthopnea and leg swelling. Gastrointestinal:  Negative for abdominal pain, anal bleeding, blood in stool, constipation, diarrhea, nausea and vomiting. Genitourinary:  Negative for decreased urine volume, difficulty urinating, dysuria, frequency, hematuria and urgency. Musculoskeletal:  Negative for back pain, myalgias and neck pain. Skin:  Negative for rash and wound.    Neurological:  Negative for dizziness, tremors, syncope and headaches. Prior to Visit Medications    Medication Sig Taking? Authorizing Provider   zoster recombinant adjuvanted vaccine [facility]) 50 MCG/0.5ML SUSR injection Inject 0.5 mLs into the muscle See Admin Instructions 1 dose now and repeat in 2-6 months Yes CASSANDRA Moyer CNP   losartan (COZAAR) 25 MG tablet Take 1/2 (one-half) tablet by mouth once daily Yes CASSANDRA Moyer CNP   atenolol (TENORMIN) 25 MG tablet TAKE 1 TABLET BY MOUTH ONCE DAILY Yes CASSANDRA Moyer CNP   atorvastatin (LIPITOR) 40 MG tablet Take 1 tablet by mouth once daily Yes CASSANDRA Moyer CNP   amLODIPine (NORVASC) 10 MG tablet Take 1 tablet by mouth daily Yes CASSANDRA Moyer CNP        Social History     Tobacco Use    Smoking status: Never    Smokeless tobacco: Never   Substance Use Topics    Alcohol use: No        Vitals:    11/10/22 0832   BP: (!) 162/73   Site: Left Upper Arm   Position: Sitting   Pulse: 74   Temp: 98.1 °F (36.7 °C)   TempSrc: Infrared   SpO2: 99%   Weight: 201 lb (91.2 kg)   Height: 5' 9\" (1.753 m)     Estimated body mass index is 29.68 kg/m² as calculated from the following:    Height as of this encounter: 5' 9\" (1.753 m). Weight as of this encounter: 201 lb (91.2 kg). DIAGNOSTIC FINDINGS:  CBC:  Lab Results   Component Value Date/Time    WBC 6.09 11/09/2021 12:00 AM    HGB 13.5 11/09/2021 12:00 AM     11/09/2021 12:00 AM       BMP:    Lab Results   Component Value Date/Time     11/09/2021 12:00 AM    K 4.4 11/09/2021 12:00 AM     11/09/2021 12:00 AM    CO2 26 11/09/2021 12:00 AM    BUN 29 11/09/2021 12:00 AM    CREATININE 1.76 11/09/2021 12:00 AM    GLUCOSE 98 01/03/2020 12:30 PM       HEMOGLOBIN A1C: No results found for: LABA1C    FASTING LIPID PANEL:  Lab Results   Component Value Date    CHOL 193 08/27/2020    HDL 45 11/09/2021    TRIG 144 08/27/2020       Physical Exam  Constitutional:       Appearance: Normal appearance.  He is not toxic-appearing. HENT:      Head: Normocephalic. Right Ear: External ear normal.      Left Ear: External ear normal.      Nose: Nose normal.      Mouth/Throat:      Mouth: Mucous membranes are moist.   Eyes:      General: No scleral icterus. Right eye: No discharge. Left eye: No discharge. Conjunctiva/sclera: Conjunctivae normal.   Pulmonary:      Effort: Pulmonary effort is normal.   Musculoskeletal:      Cervical back: Normal range of motion. Skin:     Coloration: Skin is not jaundiced. Neurological:      Mental Status: He is alert and oriented to person, place, and time. Psychiatric:         Mood and Affect: Mood normal.         Behavior: Behavior normal.         Thought Content: Thought content normal.       ASSESSMENT     Diagnosis Orders   1. Medicare annual wellness visit, subsequent        2. ACP (advance care planning)        3. Need for influenza vaccination  Influenza, FLUAD, (age 72 y+), IM, Preservative Free, 0.5 mL      4. Need for vaccination  zoster recombinant adjuvanted vaccine [facility]) 50 MCG/0.5ML SUSR injection      5. Essential hypertension        6. Mixed hyperlipidemia               PLAN:  Orders Placed This Encounter   Medications    zoster recombinant adjuvanted vaccine (SHINGRIX) 50 MCG/0.5ML SUSR injection     Sig: Inject 0.5 mLs into the muscle See Admin Instructions 1 dose now and repeat in 2-6 months     Dispense:  0.5 mL     Refill:  0         Blood pressure remains elevated kidney function is stable, increase losartan to a full tablet of 25 mg daily. Cologuard ordered for colon cancer screen    FOLLOW UP AND INSTRUCTIONS:  Return in about 6 months (around 5/10/2023). Mauro Mauricio received counseling on the following healthy behaviors:medication adherence    Discussed use, benefit, and side effects of prescribed medications. Barriers to  medication compliance addressed. All patient questions answered.   Pt  verbalized understanding of all instructions given. Patient given educational materials - see patient instructions      Patient advised to contact scheduling offices for any referrals or imaging orders  placed today if they have not been contacted in 48 hours. Return in about 6 months (around 5/10/2023). An electronic signature was used to authenticate this note. --CASSANDRA Johnson CNP on 11/10/2022 at 9:18 AM  Visit Information    Have you changed or started any medications since your last visit including any over-the-counter medicines, vitamins, or herbal medicines? no   Are you having any side effects from any of your medications? -  no  Have you stopped taking any of your medications? Is so, why? -  no    Have you seen any other physician or provider since your last visit? No  Have you had any other diagnostic tests since your last visit? No  Have you been seen in the emergency room and/or had an admission to a hospital since we last saw you? No  Have you had your routine dental cleaning in the past 6 months? yes -     Have you activated your [employer] account? If not, what are your barriers?  Yes     Patient Care Team:  CASSANDRA Johnson CNP as PCP - General (Family Medicine)  CASSANDRA Johnson CNP as PCP [facility]  Delfina Aden MD as Consulting Physician (Urology)  Franco Wild MD as Consulting Physician (Nephrology)    Medical History Review  Past Medical, Family, and Social History reviewed and does not contribute to the patient presenting condition    Health Maintenance   Topic Date Due    DTaP/Tdap/Td vaccine (1 - Tdap) Never done    Shingles vaccine (1 of 2) Never done    Colorectal Cancer Screen  04/17/2022    COVID-19 Vaccine (5 - Booster for [employer] MET-TECH series) 06/08/2022    Lipids  11/09/2022    Depression Screen  05/10/2023    Annual Wellness Visit (AWV)  11/11/2023    Flu vaccine  Completed    Pneumococcal 65+ years Vaccine  Completed    Hepatitis C screen  Completed    Hepatitis A vaccine  Aged Out    Hib vaccine  Aged Out    Meningococcal (ACWY) vaccine  Aged Out

## 2022-11-10 NOTE — PATIENT INSTRUCTIONS
Advance Directives: Care Instructions  Overview  An advance directive is a legal way to state your wishes at the end of your life. It tells your family and your doctor what to do if you can't say what you want. There are two main types of advance directives. You can change them any time your wishes change. Living will. This form tells your family and your doctor your wishes about life support and other treatment. The form is also called a declaration. Medical power of . This form lets you name a person to make treatment decisions for you when you can't speak for yourself. This person is called a health care agent (health care proxy, health care surrogate). The form is also called a durable power of  for health care. If you do not have an advance directive, decisions about your medical care may be made by a family member, or by a doctor or a  who doesn't know you. It may help to think of an advance directive as a gift to the people who care for you. If you have one, they won't have to make tough decisions by themselves. Follow-up care is a key part of your treatment and safety. Be sure to make and go to all appointments, and call your doctor if you are having problems. It's also a good idea to know your test results and keep a list of the medicines you take. What should you include in an advance directive? Many states have a unique advance directive form. (It may ask you to address specific issues.) Or you might use a universal form that's approved by many states. If your form doesn't tell you what to address, it may be hard to know what to include in your advance directive. Use the questions below to help you get started. Who do you want to make decisions about your medical care if you are not able to? What life-support measures do you want if you have a serious illness that gets worse over time or can't be cured? What are you most afraid of that might happen?  (Maybe you're afraid of having pain, losing your independence, or being kept alive by machines.)  Where would you prefer to die? (Your home? A hospital? A nursing home?)  Do you want to donate your organs when you die? Do you want certain Buddhist practices performed before you die? When should you call for help? Be sure to contact your doctor if you have any questions. Where can you learn more? Go to [email or web address] and sign in to your [employer] account. Enter R264 in the I-DISPO box to learn more about \"Advance Directives: Care Instructions. \"     If you do not have an account, please click on the \"Sign Up Now\" link. Current as of: June 16, 2022               Content Version: 13.4  © 3647-7155 Healthwise, [employer]. Care instructions adapted under license by [facility] ([facility]). If you have questions about a medical condition or this instruction, always ask your healthcare professional. [place] [address] any warranty or liability for your use of this information. Personalized Preventive Plan for Stuart Martinez - 11/10/2022  Medicare offers a range of preventive health benefits. Some of the tests and screenings are paid in full while other may be subject to a deductible, co-insurance, and/or copay. Some of these benefits include a comprehensive review of your medical history including lifestyle, illnesses that may run in your family, and various assessments and screenings as appropriate. After reviewing your medical record and screening and assessments performed today your provider may have ordered immunizations, labs, imaging, and/or referrals for you. A list of these orders (if applicable) as well as your Preventive Care list are included within your After Visit Summary for your review.     Other Preventive Recommendations:    A preventive eye exam performed by an eye specialist is recommended every 1-2 years to screen for glaucoma; cataracts, macular

## 2022-11-10 NOTE — PROGRESS NOTES
Medicare Annual Wellness Visit    Lourdes Falcon is here for Medicare AWV and Flu Vaccine    Assessment & Plan   Medicare annual wellness visit, subsequent  ACP (advance care planning)    Recommendations for Preventive Services Due: see orders and patient instructions/AVS.  Recommended screening schedule for the next 5-10 years is provided to the patient in written form: see Patient Instructions/AVS.     No follow-ups on file. Subjective       Patient's complete Health Risk Assessment and screening values have been reviewed and are found in Flowsheets. The following problems were reviewed today and where indicated follow up appointments were made and/or referrals ordered. Positive Risk Factor Screenings with Interventions:             General Health and ACP:  General  In general, how would you say your health is?: Very Good  In the past 7 days, have you experienced any of the following: New or Increased Pain, New or Increased Fatigue, Loneliness, Social Isolation, Stress or Anger?: No  Do you get the social and emotional support that you need?: Yes  Do you have a Living Will?: (!) No    Advance Directives       Power of  Living Will ACP-Advance Directive ACP-Power of     Not on File Not on File Not on File Not on File          General Health Risk Interventions:  No Living Will: Advance Care Planning addressed with patient today     Hearing/Vision:  Do you or your family notice any trouble with your hearing that hasn't been managed with hearing aids?: No  Do you have difficulty driving, watching TV, or doing any of your daily activities because of your eyesight?: No  Have you had an eye exam within the past year?: (!) No  No results found.   Hearing/Vision Interventions:  Vision concerns:  patient encouraged to make appointment with his/her eye specialist            Objective   Vitals:    11/10/22 0832   BP: (!) 162/73   Site: Left Upper Arm   Position: Sitting   Pulse: 74   Temp: 98.1 °F (36.7 °C)   TempSrc: Infrared   SpO2: 99%   Weight: 201 lb (91.2 kg)   Height: 5' 9\" (1.753 m)      Body mass index is 29.68 kg/m². Allergies   Allergen Reactions    Lisinopril Swelling     Prior to Visit Medications    Medication Sig Taking?  Authorizing Provider   losartan (COZAAR) 25 MG tablet Take 1/2 (one-half) tablet by mouth once daily Yes CASSANDRA Blair CNP   atenolol (TENORMIN) 25 MG tablet TAKE 1 TABLET BY MOUTH ONCE DAILY Yes CASSANDRA Blair CNP   atorvastatin (LIPITOR) 40 MG tablet Take 1 tablet by mouth once daily Yes CASSANDRA Blair CNP   amLODIPine (NORVASC) 10 MG tablet Take 1 tablet by mouth daily Yes CASSANDRA Blair CNP       CareTeam (Including outside providers/suppliers regularly involved in providing care):   Patient Care Team:  CASSANDRA Blair CNP as PCP - General (Family Medicine)  CASSANDRA Blair CNP as PCP - REHABILITATION [facility] Empan[facility] Provider  Nate Stack MD as Consulting Physician (Urology)  Sanna Rothman MD as Consulting Physician (Nephrology)     Reviewed and updated this visit:  Tobacco  Allergies  Meds  Med Hx  Surg Hx  Soc Hx  Fam Hx

## 2022-12-12 ENCOUNTER — TELEPHONE (OUTPATIENT)
Dept: PRIMARY CARE CLINIC | Age: 77
End: 2022-12-12

## 2022-12-12 DIAGNOSIS — R19.5 POSITIVE COLORECTAL CANCER SCREENING USING COLOGUARD TEST: Primary | ICD-10-CM

## 2022-12-12 NOTE — TELEPHONE ENCOUNTER
Please notify Modesta Urias that his home Cologuard test did come back positive. As discussed in office, past result does require further evaluation by colonoscopy to rule out colon cancer as the cause of positive result.   I can place an order for the GI clinic, most of our patients attend the clinic near [facility]

## 2022-12-15 DIAGNOSIS — R19.5 POSITIVE COLORECTAL CANCER SCREENING USING COLOGUARD TEST: Primary | ICD-10-CM

## 2022-12-15 NOTE — TELEPHONE ENCOUNTER
Patient notified and verbalized understanding. Patient would like to be referred to GI at [facility]. States he seen them in the past and it is closer to his home.

## 2022-12-27 ENCOUNTER — TELEPHONE (OUTPATIENT)
Dept: GASTROENTEROLOGY | Age: 77
End: 2022-12-27

## 2022-12-27 NOTE — TELEPHONE ENCOUNTER
Rec'd referral from PCP regarding scheduling colon for positive cologuard. Writer called pt, pt did not answer, Georges Casillas asking pt to return office call to schedule colon. 1st attempt; called and LVM for patient regarding colon screen referral.    2nd attempt; mailed colon screen letter to patient's home address.

## 2023-01-03 DIAGNOSIS — R19.5 POSITIVE COLORECTAL CANCER SCREENING USING COLOGUARD TEST: Primary | ICD-10-CM

## 2023-01-03 NOTE — TELEPHONE ENCOUNTER
Writer called pt to schedule colon, pt states his last colon was 10 years ago, does not remember who did it, but it was clear no polyps. Writer notes pt is not est with any provider in this practice, per colon screen questionnaire pt can be scheduled for colon. ALEX Dabeugenie Friday [email or web address] colon(new positive cologuard) golytely. Reviewed bowel prep instructions with patient over phone and mailed to home address.

## 2023-01-04 RX ORDER — POLYETHYLENE GLYCOL 3350, SODIUM SULFATE ANHYDROUS, SODIUM BICARBONATE, SODIUM CHLORIDE, POTASSIUM CHLORIDE 236; 22.74; 6.74; 5.86; 2.97 G/4L; G/4L; G/4L; G/4L; G/4L
POWDER, FOR SOLUTION ORAL
Qty: 4000 ML | Refills: 0 | Status: SHIPPED | OUTPATIENT
Start: 2023-01-04

## 2023-01-06 NOTE — TELEPHONE ENCOUNTER
Pt is scheduled for a colon on 1/27/23, per Barak schedule change pt needs moved, colon as as follows;    ALEX Cancino Friday [email or web address] colon(new positive cologuard) golytely. Reviewed bowel prep instructions with patient over phone and mailed to home address.

## 2023-01-20 ENCOUNTER — HOSPITAL ENCOUNTER (OUTPATIENT)
Age: 78
Setting detail: OUTPATIENT SURGERY
Discharge: HOME | End: 2023-01-20
Attending: INTERNAL MEDICINE | Admitting: INTERNAL MEDICINE
Payer: MEDICARE

## 2023-01-20 ENCOUNTER — ANESTHESIA (OUTPATIENT)
Dept: OPERATING ROOM | Age: 78
End: 2023-01-20
Payer: MEDICARE

## 2023-01-20 ENCOUNTER — ANESTHESIA EVENT (OUTPATIENT)
Dept: OPERATING ROOM | Age: 78
End: 2023-01-20
Payer: MEDICARE

## 2023-01-20 VITALS
TEMPERATURE: 97.3 F | DIASTOLIC BLOOD PRESSURE: 77 MMHG | HEART RATE: 62 BPM | WEIGHT: 201 LBS | RESPIRATION RATE: 14 BRPM | BODY MASS INDEX: 29.77 KG/M2 | HEIGHT: 69 IN | SYSTOLIC BLOOD PRESSURE: 132 MMHG | OXYGEN SATURATION: 99 %

## 2023-01-20 DIAGNOSIS — R19.5 POSITIVE COLORECTAL CANCER SCREENING USING COLOGUARD TEST: ICD-10-CM

## 2023-01-20 PROCEDURE — 3700000001 HC ADD 15 MINUTES (ANESTHESIA): Performed by: INTERNAL MEDICINE

## 2023-01-20 PROCEDURE — 3700000000 HC ANESTHESIA ATTENDED CARE: Performed by: INTERNAL MEDICINE

## 2023-01-20 PROCEDURE — 7100000010 HC PHASE II RECOVERY - FIRST 15 MIN: Performed by: INTERNAL MEDICINE

## 2023-01-20 PROCEDURE — 7100000011 HC PHASE II RECOVERY - ADDTL 15 MIN: Performed by: INTERNAL MEDICINE

## 2023-01-20 PROCEDURE — 88305 TISSUE EXAM BY PATHOLOGIST: CPT

## 2023-01-20 PROCEDURE — 3609010300 HC COLONOSCOPY W/BIOPSY SINGLE/MULTIPLE: Performed by: INTERNAL MEDICINE

## 2023-01-20 PROCEDURE — 2709999900 HC NON-CHARGEABLE SUPPLY: Performed by: INTERNAL MEDICINE

## 2023-01-20 PROCEDURE — 2500000003 HC RX 250 WO HCPCS: Performed by: NURSE ANESTHETIST, CERTIFIED REGISTERED

## 2023-01-20 PROCEDURE — 2580000003 HC RX 258: Performed by: ANESTHESIOLOGY

## 2023-01-20 PROCEDURE — 6360000002 HC RX W HCPCS: Performed by: NURSE ANESTHETIST, CERTIFIED REGISTERED

## 2023-01-20 PROCEDURE — 45378 DIAGNOSTIC COLONOSCOPY: CPT | Performed by: INTERNAL MEDICINE

## 2023-01-20 RX ORDER — SODIUM CHLORIDE 0.9 % (FLUSH) 0.9 %
5-40 SYRINGE (ML) INJECTION EVERY 12 HOURS SCHEDULED
Status: DISCONTINUED | OUTPATIENT
Start: 2023-01-20 | End: 2023-01-20 | Stop reason: HOSPADM

## 2023-01-20 RX ORDER — SODIUM CHLORIDE 0.9 % (FLUSH) 0.9 %
5-40 SYRINGE (ML) INJECTION PRN
Status: DISCONTINUED | OUTPATIENT
Start: 2023-01-20 | End: 2023-01-20 | Stop reason: HOSPADM

## 2023-01-20 RX ORDER — SODIUM CHLORIDE 9 MG/ML
INJECTION, SOLUTION INTRAVENOUS PRN
Status: DISCONTINUED | OUTPATIENT
Start: 2023-01-20 | End: 2023-01-20 | Stop reason: HOSPADM

## 2023-01-20 RX ORDER — LIDOCAINE HYDROCHLORIDE 20 MG/ML
INJECTION, SOLUTION EPIDURAL; INFILTRATION; INTRACAUDAL; PERINEURAL PRN
Status: DISCONTINUED | OUTPATIENT
Start: 2023-01-20 | End: 2023-01-20 | Stop reason: SDUPTHER

## 2023-01-20 RX ORDER — LIDOCAINE HYDROCHLORIDE 10 MG/ML
1 INJECTION, SOLUTION EPIDURAL; INFILTRATION; INTRACAUDAL; PERINEURAL
Status: DISCONTINUED | OUTPATIENT
Start: 2023-01-20 | End: 2023-01-20 | Stop reason: HOSPADM

## 2023-01-20 RX ORDER — SODIUM CHLORIDE 9 MG/ML
INJECTION, SOLUTION INTRAVENOUS CONTINUOUS
Status: DISCONTINUED | OUTPATIENT
Start: 2023-01-20 | End: 2023-01-20 | Stop reason: HOSPADM

## 2023-01-20 RX ORDER — SODIUM CHLORIDE, SODIUM LACTATE, POTASSIUM CHLORIDE, CALCIUM CHLORIDE 600; 310; 30; 20 MG/100ML; MG/100ML; MG/100ML; MG/100ML
INJECTION, SOLUTION INTRAVENOUS CONTINUOUS
Status: DISCONTINUED | OUTPATIENT
Start: 2023-01-20 | End: 2023-01-20 | Stop reason: HOSPADM

## 2023-01-20 RX ORDER — PROPOFOL 10 MG/ML
INJECTION, EMULSION INTRAVENOUS PRN
Status: DISCONTINUED | OUTPATIENT
Start: 2023-01-20 | End: 2023-01-20 | Stop reason: SDUPTHER

## 2023-01-20 RX ADMIN — PROPOFOL 50 MG: 10 INJECTION, EMULSION INTRAVENOUS at 12:10

## 2023-01-20 RX ADMIN — LIDOCAINE HYDROCHLORIDE 50 MG: 20 INJECTION, SOLUTION EPIDURAL; INFILTRATION; INTRACAUDAL; PERINEURAL at 12:05

## 2023-01-20 RX ADMIN — PROPOFOL 50 MG: 10 INJECTION, EMULSION INTRAVENOUS at 12:20

## 2023-01-20 RX ADMIN — PROPOFOL 50 MG: 10 INJECTION, EMULSION INTRAVENOUS at 12:15

## 2023-01-20 RX ADMIN — PROPOFOL 100 MG: 10 INJECTION, EMULSION INTRAVENOUS at 12:05

## 2023-01-20 RX ADMIN — PROPOFOL 50 MG: 10 INJECTION, EMULSION INTRAVENOUS at 12:25

## 2023-01-20 RX ADMIN — SODIUM CHLORIDE, POTASSIUM CHLORIDE, SODIUM LACTATE AND CALCIUM CHLORIDE: 600; 310; 30; 20 INJECTION, SOLUTION INTRAVENOUS at 10:01

## 2023-01-20 ASSESSMENT — PAIN - FUNCTIONAL ASSESSMENT: PAIN_FUNCTIONAL_ASSESSMENT: 0-10

## 2023-01-20 ASSESSMENT — ENCOUNTER SYMPTOMS: SHORTNESS OF BREATH: 0

## 2023-01-20 ASSESSMENT — PAIN SCALES - GENERAL
PAINLEVEL_OUTOF10: 0

## 2023-01-20 NOTE — ANESTHESIA PRE PROCEDURE
Department of Anesthesiology  Preprocedure Note       Name:  Neville Núñez   Age:  78 y.o.  :  1945                                          MRN:  [id number]         Date:  2023      Surgeon: Galileo Romero):  Rocky Cobb MD    Procedure: Procedure(s):  COLONOSCOPY DIAGNOSTIC    Medications prior to admission:   Prior to Admission medications    Medication Sig Start Date End Date Taking? Authorizing Provider   polyethylene glycol (GOLYTELY) 236 g solution Please follow instructions given to you by your provider 23   Rocky Cobb MD   zoster recombinant adjuvanted vaccine [facility]) 50 MCG/0.5ML SUSR injection Inject 0.5 mLs into the muscle See Admin Instructions 1 dose now and repeat in 2-6 months 11/10/22 5/9/23  CASSANDRA Fernández CNP   losartan (COZAAR) 25 MG tablet Take 1 tablet by mouth daily 11/10/22 2/8/23  CASSANDRA Fernández CNP   atenolol (TENORMIN) 25 MG tablet TAKE 1 TABLET BY MOUTH ONCE DAILY 11/10/22   CASSANDRA Fernández CNP   atorvastatin (LIPITOR) 40 MG tablet Take 1 tablet by mouth once daily 11/10/22   CASSANDRA Fernández CNP   amLODIPine (NORVASC) 10 MG tablet Take 1 tablet by mouth daily 11/10/22   CASSANDRA Fernández CNP       Current medications:    Current Facility-Administered Medications   Medication Dose Route Frequency Provider Last Rate Last Admin    lidocaine PF 1 % injection 1 mL  1 mL IntraDERmal Once PRN Derrick Hall MD        0.9 % sodium chloride infusion   IntraVENous Continuous Ndal Cesario Wilks MD        lactated ringers IV soln infusion   IntraVENous Continuous Ndal Nickcel MD Efe        sodium chloride flush 0.9 % injection 5-40 mL  5-40 mL IntraVENous 2 times per day Derrick Hall MD        sodium chloride flush 0.9 % injection 5-40 mL  5-40 mL IntraVENous PRN Derrick Hall MD        0.9 % sodium chloride infusion   IntraVENous PRN Derrick Hall MD           Allergies:     Allergies   Allergen Reactions    Lisinopril Swelling       Problem List:    Patient Active Problem List   Diagnosis Code    Sprain, lumbosacral S33. 9XXA    Unstable gait R26.81    Right lumbosacral radiculopathy M54.17    S/P laminectomy Z98.890    ARF (acute renal failure) ([facility]) N17.9    Obstructive uropathy N13.9    Bladder outlet obstruction N32.0    Anemia in CKD (chronic kidney disease) ([facility]) N18.9, D63.1    Problem with medical care compliance Z91.199    Hepatic dysfunction K76.9    Mixed hyperlipidemia E78.2    Essential hypertension I10    Renal failure, chronic N18.9    Normocytic normochromic anemia D64.9    Overweight E66.3    Stage 3 chronic kidney disease, unspecified whether stage 3a or 3b CKD ([facility]) N18.30       Past Medical History:        Diagnosis Date    ARF (acute renal failure) ([facility] [address] 11/12/2014    WAS ON DIALYSIS FOR 2 MONTHS    Hypertension        Past Surgical History:        Procedure Laterality Date    BACK SURGERY  1988    BACK SURGERY  2014    L1-2-3    CATHETER REMOVAL      PERMACATHETER FOR DIALYSIS REMOVED,(2/2015)    COLONOSCOPY      CYSTOSCOPY N/A 12/03/14    OTHER SURGICAL HISTORY Right 12/16/2014    perm cath for dialysis. (STOPPED AFTER 2 MONTHS, DONE 1/2015)    TURP  03/23/2015       Social History:    Social History     Tobacco Use    Smoking status: Never    Smokeless tobacco: Never   Substance Use Topics    Alcohol use:  No                                Counseling given: Not Answered      Vital Signs (Current):   Vitals:    01/20/23 0934 01/20/23 0943   BP: (!) 184/68    Pulse: 67    Resp: 18    Temp: 97.5 °F (36.4 °C)    SpO2: 98%    Weight:  201 lb (91.2 kg)   Height:  5' 9\" (1.753 m)                                              BP Readings from Last 3 Encounters:   01/20/23 (!) 184/68   11/10/22 (!) 162/73   05/10/22 (!) 146/82       NPO Status: Time of last liquid consumption: 2200                        Time of last solid consumption: 1800                        Date of last liquid consumption: 01/19/23 Date of last solid food consumption: 01/18/23    BMI:   Wt Readings from Last 3 Encounters:   01/20/23 201 lb (91.2 kg)   11/10/22 201 lb (91.2 kg)   05/10/22 201 lb (91.2 kg)     Body mass index is 29.68 kg/m². CBC:   Lab Results   Component Value Date/Time    WBC 6.09 11/09/2021 12:00 AM    RBC 4.88 11/09/2021 12:00 AM    HGB 13.5 11/09/2021 12:00 AM    HCT 40.7 11/09/2021 12:00 AM    MCV 83.4 11/09/2021 12:00 AM    RDW 43.8 11/09/2021 12:00 AM     11/09/2021 12:00 AM       CMP:   Lab Results   Component Value Date/Time     11/09/2021 12:00 AM    K 4.4 11/09/2021 12:00 AM     11/09/2021 12:00 AM    CO2 26 11/09/2021 12:00 AM    BUN 29 11/09/2021 12:00 AM    CREATININE 1.76 11/09/2021 12:00 AM    GFRAA 45 01/03/2020 12:30 PM    LABGLOM 38 01/03/2020 12:30 PM    GLUCOSE 98 01/03/2020 12:30 PM    PROT 7.8 11/09/2021 12:00 AM    PROT 7.3 08/27/2020 12:00 AM    CALCIUM 9.8 11/09/2021 12:00 AM    BILITOT 0.7 11/09/2021 12:00 AM    ALKPHOS 67 11/09/2021 12:00 AM    AST 52 11/09/2021 12:00 AM    ALT 38 11/09/2021 12:00 AM       POC Tests: No results for input(s): POCGLU, POCNA, POCK, POCCL, POCBUN, POCHEMO, POCHCT in the last 72 hours.     Coags:   Lab Results   Component Value Date/Time    PROTIME 10.3 02/10/2015 08:07 AM    INR 1.0 02/10/2015 08:07 AM    APTT 24.6 02/10/2015 08:07 AM       HCG (If Applicable): No results found for: PREGTESTUR, PREGSERUM, HCG, HCGQUANT     ABGs: No results found for: PHART, PO2ART, NQK7YBW, SCR4JGF, BEART, D0ZJEHMU     Type & Screen (If Applicable):  No results found for: LABABO, LABRH    Drug/Infectious Status (If Applicable):  Lab Results   Component Value Date/Time    HEPCAB NONREACTIVE 11/22/2014 12:07 AM       COVID-19 Screening (If Applicable): No results found for: COVID19        Anesthesia Evaluation    Airway: Mallampati: I  TM distance: >3 FB     Mouth opening: > = 3 FB   Dental:          Pulmonary:       (-) shortness of breath                           Cardiovascular:    (+) hypertension:,     (-)  angina                Neuro/Psych:               GI/Hepatic/Renal:   (+) renal disease: CRI,           Endo/Other:                     Abdominal:             Vascular:           Other Findings:           Anesthesia Plan      MAC     ASA 3                                   Ty MD Theodore   1/20/2023

## 2023-01-20 NOTE — ANESTHESIA POSTPROCEDURE EVALUATION
Department of Anesthesiology  Postprocedure Note    Patient: Alcira Ledezma  MRN: [id number]  YOB: 1945  Date of evaluation: 1/20/2023      Procedure Summary     Date: 01/20/23 Room / Location: [address]    Anesthesia Start: 5050 Anesthesia Stop: 3589    Procedure: COLONOSCOPY WITH BIOPSY Diagnosis:       Positive colorectal cancer screening using Cologuard test      (Positive colorectal cancer screening using Cologuard test [R19.5])    Surgeons: Basim Barron MD Responsible Provider: Simba Masterson MD    Anesthesia Type: MAC ASA Status: 3          Anesthesia Type: No value filed.     Kash Phase I:      Kash Phase II: Kash Score: 10      Anesthesia Post Evaluation    Complications: no

## 2023-01-20 NOTE — DISCHARGE INSTRUCTIONS
POST COLONOSCOPY & EGD INSTRUCTIONS    1. ACTIVITY   No driving, operating machinery, signing legal papers, or making important decisions for 24 hours   Resume normal activity after 24 hours   You may return to work after 24 hours. 2. DIET  _____  Diet modification: {YES / NO:75057}   Once you are able to swallow water normally you may resume your normal diet. Try to avoid spicy, greasy, or fried foods for your first meal after the procedure      3. MEDICATIONS   Do not consume alcohol, tranquilizers or sleeping medications for 24 hours unless otherwise advised by your physician. Resume your usual medications unless otherwise instructed. 4. NORMAL CHANGES YOU MAY EXPERIENCE AFTER ENDOSCOPY:  From the Colonoscopy:   Passing gas for several hours is normal   Some mild abdominal cramping is normal   If a biopsy/polypectomy was done, you may see some spotting of blood   You may feel tired or worn out for the next 24-48 hours due to the prep and sedation  From the EGD:   A sore throat is normal   A bloated feeling and belching from air in the stomach is normal   If a biopsy was done, you may spit up some blood tinged mucus         5. CALL YOUR PHYSICIAN IF YOU EXPERIENCE ANY OF THE FOLLOWING   Vomiting blood or passing blood rectally (color may be red or black)   Severe abdominal pain or tenderness (that is not relieved by passing air)   Fever, chills, or excessive sweating   Persistent nausea or vomiting   Redness or swelling at the IV site      6.  ADDITIONAL INSTRUCTIONS      IF YOU HAVE ANY QUESTIONS OR CONCERNS PLEASE CALL YOUR DOCTOR,  IF YOU FEEL YOU HAVE A MEDICAL EMERGENCY PLEASE DIAL [phone]

## 2023-01-20 NOTE — H&P
GI History and Physical    Pt Name: Saundra Garcia  MRN: [id number]  YOB: 1945  Date of evaluation: 1/20/2023  Primary Care Physician: Marien Homans, APRN - CNP    SUBJECTIVE:   History of Chief Complaint: This is Saundra Garcia a 78 y.o. male who presents today for a DIAGNOSTIC COLONOSCOPY  by Dr Rachel Frost. .   The patient completed the prep as directed Yes. Bowel movements have not significantly changed The patient does not have a family history of colon cancer or polyps. Previous colonoscopy Yes.  1988 . Patient today denies  fever, chills, night sweats, pain or unexplained weight loss. HE DOES NOT TAKE BLOOD THINNERS, HE DOES NOT HAVE DIABETES. PATIENT HAS END STAGE RENAL FAILURE AND IS ON HEMODIALYSIS Monday,Wednesday AND FRIDAYS. Past Medical History    has a past medical history of ARF (acute renal failure) ([facility] [address] and Hypertension. Past Surgical History   has a past surgical history that includes Cystocopy (N/A, 12/03/14); other surgical history (Right, 12/16/2014); Catheter Removal; Colonoscopy; back surgery (1988); back surgery (2014); and TURP (03/23/2015). Medications  Prior to Admission medications    Medication Sig Start Date End Date Taking?  Authorizing Provider   polyethylene glycol (GOLYTELY) 236 g solution Please follow instructions given to you by your provider 1/4/23   Hunter Gao MD   zoster recombinant adjuvanted vaccine [facility]) 50 MCG/0.5ML SUSR injection Inject 0.5 mLs into the muscle See Admin Instructions 1 dose now and repeat in 2-6 months 11/10/22 5/9/23  Marien Homans, APRN - CNP   losartan (COZAAR) 25 MG tablet Take 1 tablet by mouth daily 11/10/22 2/8/23  Marien Homans, APRN - CNP   atenolol (TENORMIN) 25 MG tablet TAKE 1 TABLET BY MOUTH ONCE DAILY 11/10/22   Marien Homans, APRN - CNP   atorvastatin (LIPITOR) 40 MG tablet Take 1 tablet by mouth once daily 11/10/22   Marien Homans, APRN - CNP   amLODIPine ([address]) 10 MG tablet Take 1 tablet by mouth daily 11/10/22   Sita Port, APRN - CNP     Allergies  is allergic to lisinopril. Family History  family history includes Arthritis in his mother; Heart Disease in his mother; High Cholesterol in his mother; Forestine Soper / [place]vy [place] in his mother; Substance Abuse in his father. Denies esophageal, stomach, pancreatic, liver, or colon CANCERS IN THE FAMILY. 2 BROTHERS HAD PROSTATE CANCER  Social History   reports that he has never smoked. He has never used smokeless tobacco.   reports no history of alcohol use. reports no history of drug use. ROS: Pertinent findings in the HPI above. A comprehensive review of systems was essentially negative Denies. exertional chest pain, dyspnea, gastrointestinal symptoms, musculoskeletal symptoms, and neurologic symptoms     OBJECTIVE:   VITALS:  height is 5' 9\" (1.753 m) and weight is 201 lb (91.2 kg). His temperature is 97.5 °F (36.4 °C). His blood pressure is 184/68 (abnormal) and his pulse is 67. His respiration is 18 and oxygen saturation is 98%. CONSTITUTIONAL:This is a 78 y.o. male who is cooperative, pleasant, alert & orientated x 3, and in no acute distress   SKIN:  Warm and dry, no rashes   HEAD:  Normocephalic, atraumatic   EYES: PERRL. EOMs intact. EARS:  Hearing grossly WNL. NOSE:  Nares patent. No rhinorrhea   THROAT:  Airway is patent, membranes are moist   NECK:supple, no lymphadenopathy  LUNGS: Clear to auscultation bilaterally, no wheezes, rales, or rhonchi. CARDIOVASCULAR: Heart sounds are normal.  Regular rate and rhythm without murmur, gallop or rub.    ABDOMEN: soft, non tender, non distended, bowel sounds present  EXTREMITIES: no peripheral edema bilateral   NEURO: Cranial nerves II-XII grossly intact Strength 5+/5+     Testing:   EKG:     Lab Review:  CBC:   Lab Results   Component Value Date/Time    WBC 6.09 11/09/2021 12:00 AM    RBC 4.88 11/09/2021 12:00 AM    HGB 13.5 11/09/2021 12:00 AM    HCT 40.7 11/09/2021 12:00 AM    MCV 83.4 11/09/2021 12:00 AM    MCH 27.7 11/09/2021 12:00 AM    MCHC 33.2 11/09/2021 12:00 AM    RDW 43.8 11/09/2021 12:00 AM     11/09/2021 12:00 AM       IMPRESSIONS:   POSITIVE COLOOGUARD TEST    has a past medical history of ARF (acute renal failure) ([facility] [address] (11/12/2014) and Hypertension.    PLANS:   DIAGNOSTIC COLONOSCOPY    CASSANDRA You - CNP  ACNP-BC  Electronically signed 1/20/2023 at 9:49 AM

## 2023-01-20 NOTE — OP NOTE
PROCEDURE NOTE    DATE OF PROCEDURE: 1/20/2023    SURGEON: Ruby Carrero MD  Facility : [facility]  ASSISTANT: None  Anesthesia: mac   PREOPERATIVE DIAGNOSIS:   Positive Cologuard    POSTOPERATIVE DIAGNOSIS: as described below    OPERATION: Total colonoscopy     ANESTHESIA: Moderate Sedation    ESTIMATED BLOOD LOSS: less than 50     COMPLICATIONS: None. SPECIMENS:  Was Obtained:     2 large polyps from the right colon the larger ones was 12 mm removed with snare    4 polyps from the sigmoid colon the largest was 11 mm removed with hot snare      Few diverticuli    Very engorged veins with rectal hemorrhoids and varices    No other abnormality but the prep was very poor with large amount of liquid stool    We went a couple times back-and-forth to the cecum we do not find any other pathology at this time        HISTORY: The patient is a 78y.o. year old male with history of above preop diagnosis. I recommended colonoscopy with possible biopsy or polypectomy and I explained the risk, benefits, expected outcome, and alternatives to the procedure. Risks included but are not limited to bleeding, infection, respiratory distress, hypotension, and perforation of the colon and possibility of missing a lesion. The patient understands and is in agreement. The patient was counseled at length about the risks of otf Covid-19 during their perioperative period and any recovery window from their procedure. The patient was made aware that otf Covid-19  may worsen their prognosis for recovering from their procedure  and lend to a higher morbidity and/or mortality risk. All material risks, benefits, and reasonable alternatives including postponing the procedure were discussed. The patient does wish to proceed with the procedure at this time. PROCEDURE: The patient was given IV conscious sedation. The patient's SPO2 remained above 90% throughout the procedure.      The colonoscope was inserted per rectum and advanced under direct vision to the cecum without difficulty. Post sedation note : The patient's SPO2 remained above 90% throughout the procedure. the vital signs remained stable , and no immediate complication form the procedure noted, patient will be ready for d/c when criteria is met . The prep was poor. Findings:  Terminal ileum: normal     2 large polyps from the right colon the larger ones was 12 mm removed with snare    4 polyps from the sigmoid colon the largest was 11 mm removed with hot snare      Few diverticuli    Very engorged veins with rectal hemorrhoids and varices    No other abnormality but the prep was very poor with large amount of liquid stool    We went a couple times back-and-forth to the cecum we do not find any other pathology at this time        Withdrawal Time was (minutes): 16    The colon was decompressed and the scope was removed. The patient tolerated the procedure well.      Recommendations/Plan:   Lifestyle and dietary modifications as discussed  F/U Biopsies  F/U In OfficeYes  Discussed with the family  Repeat colonoscopy [id number] because of the number of the polyps, the poor preparation, and the positive Cologuard    Electronically signed by Oumar Latif MD  on 1/20/2023 at 12:34 PM

## 2023-01-23 LAB — SURGICAL PATHOLOGY REPORT: NORMAL

## 2023-01-27 ENCOUNTER — TELEPHONE (OUTPATIENT)
Dept: GASTROENTEROLOGY | Age: 78
End: 2023-01-27

## 2023-05-10 ENCOUNTER — OFFICE VISIT (OUTPATIENT)
Dept: PRIMARY CARE CLINIC | Age: 78
End: 2023-05-10
Payer: MEDICARE

## 2023-05-10 VITALS
BODY MASS INDEX: 30.27 KG/M2 | DIASTOLIC BLOOD PRESSURE: 77 MMHG | WEIGHT: 205 LBS | HEART RATE: 62 BPM | OXYGEN SATURATION: 99 % | SYSTOLIC BLOOD PRESSURE: 160 MMHG

## 2023-05-10 DIAGNOSIS — E78.2 MIXED HYPERLIPIDEMIA: Primary | ICD-10-CM

## 2023-05-10 DIAGNOSIS — Z76.0 MEDICATION REFILL: ICD-10-CM

## 2023-05-10 DIAGNOSIS — N18.30 STAGE 3 CHRONIC KIDNEY DISEASE, UNSPECIFIED WHETHER STAGE 3A OR 3B CKD (HCC): ICD-10-CM

## 2023-05-10 DIAGNOSIS — I10 ESSENTIAL HYPERTENSION: ICD-10-CM

## 2023-05-10 PROBLEM — E55.9 VITAMIN D DEFICIENCY: Status: ACTIVE | Noted: 2023-05-10

## 2023-05-10 PROCEDURE — 1036F TOBACCO NON-USER: CPT | Performed by: NURSE PRACTITIONER

## 2023-05-10 PROCEDURE — G8427 DOCREV CUR MEDS BY ELIG CLIN: HCPCS | Performed by: NURSE PRACTITIONER

## 2023-05-10 PROCEDURE — 3077F SYST BP >= 140 MM HG: CPT | Performed by: NURSE PRACTITIONER

## 2023-05-10 PROCEDURE — G8417 CALC BMI ABV UP PARAM F/U: HCPCS | Performed by: NURSE PRACTITIONER

## 2023-05-10 PROCEDURE — 3078F DIAST BP <80 MM HG: CPT | Performed by: NURSE PRACTITIONER

## 2023-05-10 PROCEDURE — 1123F ACP DISCUSS/DSCN MKR DOCD: CPT | Performed by: NURSE PRACTITIONER

## 2023-05-10 PROCEDURE — 99214 OFFICE O/P EST MOD 30 MIN: CPT | Performed by: NURSE PRACTITIONER

## 2023-05-10 RX ORDER — ATENOLOL 25 MG/1
TABLET ORAL
Qty: 90 TABLET | Refills: 1 | Status: SHIPPED | OUTPATIENT
Start: 2023-05-10

## 2023-05-10 RX ORDER — LOSARTAN POTASSIUM 25 MG/1
25 TABLET ORAL DAILY
Qty: 90 TABLET | Refills: 1 | Status: SHIPPED | OUTPATIENT
Start: 2023-05-10 | End: 2023-08-08

## 2023-05-10 RX ORDER — AMLODIPINE BESYLATE 10 MG/1
10 TABLET ORAL DAILY
Qty: 90 TABLET | Refills: 1 | Status: SHIPPED | OUTPATIENT
Start: 2023-05-10

## 2023-05-10 RX ORDER — ATORVASTATIN CALCIUM 40 MG/1
TABLET, FILM COATED ORAL
Qty: 90 TABLET | Refills: 1 | Status: SHIPPED | OUTPATIENT
Start: 2023-05-10

## 2023-05-10 SDOH — ECONOMIC STABILITY: FOOD INSECURITY: WITHIN THE PAST 12 MONTHS, YOU WORRIED THAT YOUR FOOD WOULD RUN OUT BEFORE YOU GOT MONEY TO BUY MORE.: NEVER TRUE

## 2023-05-10 SDOH — ECONOMIC STABILITY: INCOME INSECURITY: HOW HARD IS IT FOR YOU TO PAY FOR THE VERY BASICS LIKE FOOD, HOUSING, MEDICAL CARE, AND HEATING?: NOT HARD AT ALL

## 2023-05-10 SDOH — ECONOMIC STABILITY: HOUSING INSECURITY
IN THE LAST 12 MONTHS, WAS THERE A TIME WHEN YOU DID NOT HAVE A STEADY PLACE TO SLEEP OR SLEPT IN A SHELTER (INCLUDING NOW)?: NO

## 2023-05-10 SDOH — ECONOMIC STABILITY: FOOD INSECURITY: WITHIN THE PAST 12 MONTHS, THE FOOD YOU BOUGHT JUST DIDN'T LAST AND YOU DIDN'T HAVE MONEY TO GET MORE.: NEVER TRUE

## 2023-05-10 ASSESSMENT — ENCOUNTER SYMPTOMS
BLURRED VISION: 0
BLOOD IN STOOL: 0
DIARRHEA: 0
ABDOMINAL PAIN: 0
ORTHOPNEA: 0
BACK PAIN: 0
SHORTNESS OF BREATH: 0
COUGH: 0
VOMITING: 0
CONSTIPATION: 0
WHEEZING: 0
ANAL BLEEDING: 0
TROUBLE SWALLOWING: 0
NAUSEA: 0

## 2023-05-10 ASSESSMENT — PATIENT HEALTH QUESTIONNAIRE - PHQ9
2. FEELING DOWN, DEPRESSED OR HOPELESS: 0
SUM OF ALL RESPONSES TO PHQ QUESTIONS 1-9: 0
SUM OF ALL RESPONSES TO PHQ9 QUESTIONS 1 & 2: 0
1. LITTLE INTEREST OR PLEASURE IN DOING THINGS: 0
SUM OF ALL RESPONSES TO PHQ QUESTIONS 1-9: 0

## 2023-05-10 NOTE — PROGRESS NOTES
[address] [facility]IMARY [facility]  [id number] [address]  Dept: [phone]  Dept Fax: [phone]    Rosalba Dewitt (:  1945) is a 70 y.o. male,Established patient, here for evaluation of the following chief complaint(s):  Follow-up (Patient is here for a 6 month follow up.)    Rosalba Dewitt presents today for follow-up of chronic additions. Did complete colonoscopy in January with GI. Repeat in 1 year due to poor prep and number of polyps. Had positive Cologuard. Follows Dr. Victor Manuel Camara at [facility] nephrology and Dr. Shukri Cox, urology. Losartan dose increased 25 mg daily at November visit due to uncontrolled blood pressure         ASSESSMENT/PLAN:  1. Mixed hyperlipidemia  -     atorvastatin (LIPITOR) 40 MG tablet; Take 1 tablet by mouth once daily, Disp-90 tablet, R-1Normal  2. Stage 3 chronic kidney disease, unspecified whether stage 3a or 3b CKD ([facility] [address]  3. Essential hypertension  -     losartan (COZAAR) 25 MG tablet; Take 1 tablet by mouth daily, Disp-90 tablet, R-1Normal  -     atenolol (TENORMIN) 25 MG tablet; TAKE 1 TABLET BY MOUTH ONCE DAILY, Disp-90 tablet, R-1Normal  -     amLODIPine (NORVASC) 10 MG tablet; Take 1 tablet by mouth daily, Disp-90 tablet, R-1Normal  4. Medication refill  -     atorvastatin (LIPITOR) 40 MG tablet; Take 1 tablet by mouth once daily, Disp-90 tablet, R-1Normal  -     amLODIPine (NORVASC) 10 MG tablet; Take 1 tablet by mouth daily, Disp-90 tablet, R-1Normal    Tolerating losartan full tablet. Elevated today, will monitor as he took all medications today. Obtain shingles vaccine appropriate for age. Return in about 6 months (around 11/10/2023) for HTN. Subjective   SUBJECTIVE/OBJECTIVE:  Taking daily medications. Follows with Dr Katelyn Rios at [facility] for Kidney disease. Upcoming visit in the next week for routine follow-up. Also following with Urology, Dr Sandro Corona.  Next appt

## 2023-05-22 NOTE — PROGRESS NOTES
GI CLINIC FOLLOW UP    INTERVAL HISTORY:   No referring provider defined for this encounter. Chief Complaint   Patient presents with    Colon Cancer Screening     Patient is f/u on colonoscopy. HISTORY OF PRESENT ILLNESS: Dorys Reynolds is a 70 y.o. male , referred for evaluation of positive colo gurad        Here for f/u    Direct colonoscopy because of positive colo guard    Colonoscopy as below   Multiple polyps and hemorrhoids  Biopsies are as below  Patient is asymptomatic  Looks amazing for his age  No N/v   no abd pain   no melena/hematemsis/hematochezia  No dysphagia/odynophagia   no wt loss   no diarrhea /contipation          The prep was poor. Findings:   Terminal ileum: normal        2 large polyps from the right colon the larger ones was 12 mm removed with snare       4 polyps from the sigmoid colon the largest was 11 mm removed with hot snare         Few diverticuli       Very engorged veins with rectal hemorrhoids and varices       No other abnormality but the prep was very poor with large amount of liquid stool       We went a couple times back-and-forth to the cecum we do not find any other pathology at this time        Withdrawal Time was (minutes): 16       The colon was decompressed and the scope was removed. The patient tolerated the procedure well. Recommendations/Plan:   1. Lifestyle and dietary modifications as discussed   2. F/U Biopsies   3. F/U In OfficeYes   4. Discussed with the family   5. Repeat colonoscopy [id number] because of the number of the polyps, the poor preparation, and the positive Cologuard       Electronically signed by Emy Knott MD  on 1/20/2023 at 12:34 PM   -- Diagnosis --   A. RIGHT COLON, BIOPSY:   -PIECES OF TUBULAR ADENOMA. B.  SIGMOID COLON, BIOPSY:   -PIECES OF TUBULAR ADENOMA.      Geeta Panchal M.D.   **Electronically Signed Out**         faizan/1/23/2023       Past Medical,Family, and Social History reviewed and does

## 2023-05-23 ENCOUNTER — OFFICE VISIT (OUTPATIENT)
Dept: GASTROENTEROLOGY | Age: 78
End: 2023-05-23
Payer: MEDICARE

## 2023-05-23 VITALS
DIASTOLIC BLOOD PRESSURE: 74 MMHG | SYSTOLIC BLOOD PRESSURE: 152 MMHG | HEART RATE: 66 BPM | WEIGHT: 202 LBS | BODY MASS INDEX: 29.83 KG/M2 | TEMPERATURE: 97.5 F

## 2023-05-23 DIAGNOSIS — K64.8 OTHER HEMORRHOIDS: ICD-10-CM

## 2023-05-23 DIAGNOSIS — Z86.010 HX OF COLONIC POLYPS: Primary | ICD-10-CM

## 2023-05-23 DIAGNOSIS — K57.90 DIVERTICULOSIS: ICD-10-CM

## 2023-05-23 PROCEDURE — 3077F SYST BP >= 140 MM HG: CPT | Performed by: INTERNAL MEDICINE

## 2023-05-23 PROCEDURE — 1123F ACP DISCUSS/DSCN MKR DOCD: CPT | Performed by: INTERNAL MEDICINE

## 2023-05-23 PROCEDURE — G8427 DOCREV CUR MEDS BY ELIG CLIN: HCPCS | Performed by: INTERNAL MEDICINE

## 2023-05-23 PROCEDURE — 1036F TOBACCO NON-USER: CPT | Performed by: INTERNAL MEDICINE

## 2023-05-23 PROCEDURE — G8417 CALC BMI ABV UP PARAM F/U: HCPCS | Performed by: INTERNAL MEDICINE

## 2023-05-23 PROCEDURE — 3078F DIAST BP <80 MM HG: CPT | Performed by: INTERNAL MEDICINE

## 2023-05-23 PROCEDURE — 99214 OFFICE O/P EST MOD 30 MIN: CPT | Performed by: INTERNAL MEDICINE

## 2023-05-23 ASSESSMENT — ENCOUNTER SYMPTOMS
BLOOD IN STOOL: 0
ANAL BLEEDING: 0
NAUSEA: 0
DIARRHEA: 0
TROUBLE SWALLOWING: 0
CHOKING: 0
SHORTNESS OF BREATH: 0
CONSTIPATION: 0
RECTAL PAIN: 0
ABDOMINAL DISTENTION: 0
VOMITING: 0
COUGH: 0
ABDOMINAL PAIN: 0
WHEEZING: 0

## 2023-08-11 DIAGNOSIS — I10 ESSENTIAL HYPERTENSION: ICD-10-CM

## 2023-08-11 RX ORDER — ATENOLOL 25 MG/1
TABLET ORAL
Qty: 90 TABLET | Refills: 0 | Status: SHIPPED | OUTPATIENT
Start: 2023-08-11

## 2023-11-06 ENCOUNTER — TELEPHONE (OUTPATIENT)
Dept: PRIMARY CARE CLINIC | Age: 78
End: 2023-11-06

## 2023-11-06 NOTE — TELEPHONE ENCOUNTER
CALLED PT LEFT MSG TO RESCHEDULE 11/10 APPT WITH ANNA TO EITHER A VIRTUAL 11/9 OR 11/10 OR ANOTHER DAY IN OFFICE AS PROVIDER WILL NOT BE IN

## 2023-11-20 ENCOUNTER — OFFICE VISIT (OUTPATIENT)
Dept: PRIMARY CARE CLINIC | Age: 78
End: 2023-11-20
Payer: MEDICARE

## 2023-11-20 VITALS
BODY MASS INDEX: 29.68 KG/M2 | OXYGEN SATURATION: 98 % | SYSTOLIC BLOOD PRESSURE: 157 MMHG | WEIGHT: 201 LBS | HEART RATE: 72 BPM | DIASTOLIC BLOOD PRESSURE: 75 MMHG

## 2023-11-20 DIAGNOSIS — E78.2 MIXED HYPERLIPIDEMIA: ICD-10-CM

## 2023-11-20 DIAGNOSIS — Z23 NEED FOR VACCINATION: ICD-10-CM

## 2023-11-20 DIAGNOSIS — N18.30 STAGE 3 CHRONIC KIDNEY DISEASE, UNSPECIFIED WHETHER STAGE 3A OR 3B CKD (HCC): ICD-10-CM

## 2023-11-20 DIAGNOSIS — I10 ESSENTIAL HYPERTENSION: Primary | ICD-10-CM

## 2023-11-20 PROCEDURE — 3077F SYST BP >= 140 MM HG: CPT | Performed by: NURSE PRACTITIONER

## 2023-11-20 PROCEDURE — G0008 ADMIN INFLUENZA VIRUS VAC: HCPCS | Performed by: NURSE PRACTITIONER

## 2023-11-20 PROCEDURE — 1036F TOBACCO NON-USER: CPT | Performed by: NURSE PRACTITIONER

## 2023-11-20 PROCEDURE — G8417 CALC BMI ABV UP PARAM F/U: HCPCS | Performed by: NURSE PRACTITIONER

## 2023-11-20 PROCEDURE — 99214 OFFICE O/P EST MOD 30 MIN: CPT | Performed by: NURSE PRACTITIONER

## 2023-11-20 PROCEDURE — G8484 FLU IMMUNIZE NO ADMIN: HCPCS | Performed by: NURSE PRACTITIONER

## 2023-11-20 PROCEDURE — 3078F DIAST BP <80 MM HG: CPT | Performed by: NURSE PRACTITIONER

## 2023-11-20 PROCEDURE — 90694 VACC AIIV4 NO PRSRV 0.5ML IM: CPT | Performed by: NURSE PRACTITIONER

## 2023-11-20 PROCEDURE — G8427 DOCREV CUR MEDS BY ELIG CLIN: HCPCS | Performed by: NURSE PRACTITIONER

## 2023-11-20 PROCEDURE — 1123F ACP DISCUSS/DSCN MKR DOCD: CPT | Performed by: NURSE PRACTITIONER

## 2023-11-20 RX ORDER — LOSARTAN POTASSIUM 50 MG/1
50 TABLET ORAL DAILY
Qty: 90 TABLET | Refills: 1 | Status: SHIPPED | OUTPATIENT
Start: 2023-11-20 | End: 2024-05-18

## 2023-11-20 RX ORDER — NIFEDIPINE 60 MG/1
60 TABLET, EXTENDED RELEASE ORAL DAILY
COMMUNITY

## 2023-11-20 ASSESSMENT — ENCOUNTER SYMPTOMS
SHORTNESS OF BREATH: 0
BLURRED VISION: 0
ORTHOPNEA: 0

## 2023-11-20 NOTE — PROGRESS NOTES
Behavior is cooperative. An electronic signature was used to authenticate this note.     --CASSANDRA Méndez - CNP

## 2023-12-05 ENCOUNTER — OFFICE VISIT (OUTPATIENT)
Dept: GASTROENTEROLOGY | Age: 78
End: 2023-12-05
Payer: MEDICARE

## 2023-12-05 VITALS — HEART RATE: 65 BPM | WEIGHT: 199 LBS | BODY MASS INDEX: 29.39 KG/M2 | OXYGEN SATURATION: 98 %

## 2023-12-05 DIAGNOSIS — Z86.010 HX OF COLONIC POLYPS: Primary | ICD-10-CM

## 2023-12-05 DIAGNOSIS — K64.8 OTHER HEMORRHOIDS: ICD-10-CM

## 2023-12-05 DIAGNOSIS — R19.5 POSITIVE COLORECTAL CANCER SCREENING USING COLOGUARD TEST: ICD-10-CM

## 2023-12-05 DIAGNOSIS — K57.90 DIVERTICULOSIS: ICD-10-CM

## 2023-12-05 PROCEDURE — G8417 CALC BMI ABV UP PARAM F/U: HCPCS | Performed by: INTERNAL MEDICINE

## 2023-12-05 PROCEDURE — G8484 FLU IMMUNIZE NO ADMIN: HCPCS | Performed by: INTERNAL MEDICINE

## 2023-12-05 PROCEDURE — 1123F ACP DISCUSS/DSCN MKR DOCD: CPT | Performed by: INTERNAL MEDICINE

## 2023-12-05 PROCEDURE — 1036F TOBACCO NON-USER: CPT | Performed by: INTERNAL MEDICINE

## 2023-12-05 PROCEDURE — G8427 DOCREV CUR MEDS BY ELIG CLIN: HCPCS | Performed by: INTERNAL MEDICINE

## 2023-12-05 PROCEDURE — 99213 OFFICE O/P EST LOW 20 MIN: CPT | Performed by: INTERNAL MEDICINE

## 2023-12-05 ASSESSMENT — ENCOUNTER SYMPTOMS
ABDOMINAL DISTENTION: 0
WHEEZING: 0
ABDOMINAL PAIN: 0
CHOKING: 0
SHORTNESS OF BREATH: 0
NAUSEA: 0
BLOOD IN STOOL: 0
VOMITING: 0
CONSTIPATION: 0
ANAL BLEEDING: 0
TROUBLE SWALLOWING: 0
RECTAL PAIN: 0
COUGH: 0
DIARRHEA: 0

## 2023-12-05 NOTE — PROGRESS NOTES
GI CLINIC FOLLOW UP    INTERVAL HISTORY:   No referring provider defined for this encounter. Chief Complaint   Patient presents with    Follow-up     6 months FU           HISTORY OF PRESENT ILLNESS: Buck Bowman is a 70 y.o. male , referred for evaluation of polyps, hemorrhoids, positive colo guard in the past     Here for f/u    Direct colonoscopy because of positive colo guard    Colonoscopy as below   Multiple polyps and hemorrhoids    No N/v   no abd pain   no melena/hematemsis/hematochezia  No dysphagia/odynophagia   no wt loss   no diarrhea /constipation     Past Medical,Family, and Social History reviewed and does contribute to the patient presentingcondition. I did review all the labs results available for the labs which were ordered by the primary care physician, and the other consultants, we search on epic at [facility] and all the available care everywhere epic    I did review all the imaging studies of the abdomen available on EMR, ordered by the primary care physician and the other consultant    I did review all the pathology from the biopsies done on the previous endoscopies    Patient's PMH/PSH,SH,PSYCH Hx, MEDs, ALLERGIES, and ROS were all reviewed and updated in the appropriate sections. PAST MEDICAL HISTORY:  Past Medical History:   Diagnosis Date    ARF (acute renal failure) ([address]) 11/12/2014    WAS ON DIALYSIS FOR 2 MONTHS    Hypertension        Past Surgical History:   Procedure Laterality Date    BACK SURGERY  01/01/1988    BACK SURGERY  01/01/2014    L1-2-3    CATHETER REMOVAL      PERMACATHETER FOR DIALYSIS REMOVED,(2/2015)    COLONOSCOPY      COLONOSCOPY  01/20/2023    COLONOSCOPY N/A 1/20/2023    COLONOSCOPY WITH BIOPSY performed by Mary Arnold MD at [address] N/A 12/03/2014    OTHER SURGICAL HISTORY Right 12/16/2014    perm cath for dialysis.  (STOPPED AFTER 2 MONTHS, DONE 1/2015)    TURP  03/23/2015       CURRENT MEDICATIONS:    Current Outpatient Medications:

## 2024-02-15 DIAGNOSIS — E78.2 MIXED HYPERLIPIDEMIA: ICD-10-CM

## 2024-02-15 DIAGNOSIS — Z76.0 MEDICATION REFILL: ICD-10-CM

## 2024-02-15 RX ORDER — ATORVASTATIN CALCIUM 40 MG/1
TABLET, FILM COATED ORAL
Qty: 90 TABLET | Refills: 0 | Status: SHIPPED | OUTPATIENT
Start: 2024-02-15

## 2024-05-20 ENCOUNTER — OFFICE VISIT (OUTPATIENT)
Dept: PRIMARY CARE CLINIC | Age: 79
End: 2024-05-20
Payer: COMMERCIAL

## 2024-05-20 VITALS
OXYGEN SATURATION: 99 % | DIASTOLIC BLOOD PRESSURE: 81 MMHG | SYSTOLIC BLOOD PRESSURE: 152 MMHG | HEIGHT: 69 IN | HEART RATE: 103 BPM | BODY MASS INDEX: 30.21 KG/M2 | WEIGHT: 204 LBS

## 2024-05-20 DIAGNOSIS — M10.9 ACUTE GOUT OF LEFT FOOT, UNSPECIFIED CAUSE: ICD-10-CM

## 2024-05-20 DIAGNOSIS — N18.32 STAGE 3B CHRONIC KIDNEY DISEASE (HCC): ICD-10-CM

## 2024-05-20 DIAGNOSIS — E78.2 MIXED HYPERLIPIDEMIA: Primary | ICD-10-CM

## 2024-05-20 DIAGNOSIS — Z76.0 MEDICATION REFILL: ICD-10-CM

## 2024-05-20 DIAGNOSIS — I10 ESSENTIAL HYPERTENSION: ICD-10-CM

## 2024-05-20 PROCEDURE — 3077F SYST BP >= 140 MM HG: CPT | Performed by: NURSE PRACTITIONER

## 2024-05-20 PROCEDURE — 3079F DIAST BP 80-89 MM HG: CPT | Performed by: NURSE PRACTITIONER

## 2024-05-20 PROCEDURE — 99214 OFFICE O/P EST MOD 30 MIN: CPT | Performed by: NURSE PRACTITIONER

## 2024-05-20 PROCEDURE — 1123F ACP DISCUSS/DSCN MKR DOCD: CPT | Performed by: NURSE PRACTITIONER

## 2024-05-20 RX ORDER — ATENOLOL 25 MG/1
25 TABLET ORAL DAILY
Qty: 90 TABLET | Refills: 0 | Status: SHIPPED | OUTPATIENT
Start: 2024-05-20

## 2024-05-20 RX ORDER — ATORVASTATIN CALCIUM 40 MG/1
40 TABLET, FILM COATED ORAL DAILY
Qty: 90 TABLET | Refills: 0 | Status: SHIPPED | OUTPATIENT
Start: 2024-05-20

## 2024-05-20 RX ORDER — METHYLPREDNISOLONE 4 MG/1
TABLET ORAL
Qty: 1 KIT | Refills: 0 | Status: SHIPPED | OUTPATIENT
Start: 2024-05-20 | End: 2024-05-26

## 2024-05-20 RX ORDER — LOSARTAN POTASSIUM 100 MG/1
100 TABLET ORAL DAILY
Qty: 90 TABLET | Refills: 1 | Status: SHIPPED | OUTPATIENT
Start: 2024-05-20 | End: 2024-11-16

## 2024-05-20 SDOH — ECONOMIC STABILITY: FOOD INSECURITY: WITHIN THE PAST 12 MONTHS, THE FOOD YOU BOUGHT JUST DIDN'T LAST AND YOU DIDN'T HAVE MONEY TO GET MORE.: NEVER TRUE

## 2024-05-20 SDOH — ECONOMIC STABILITY: FOOD INSECURITY: WITHIN THE PAST 12 MONTHS, YOU WORRIED THAT YOUR FOOD WOULD RUN OUT BEFORE YOU GOT MONEY TO BUY MORE.: NEVER TRUE

## 2024-05-20 SDOH — ECONOMIC STABILITY: INCOME INSECURITY: HOW HARD IS IT FOR YOU TO PAY FOR THE VERY BASICS LIKE FOOD, HOUSING, MEDICAL CARE, AND HEATING?: NOT HARD AT ALL

## 2024-05-20 ASSESSMENT — ENCOUNTER SYMPTOMS
ORTHOPNEA: 0
BLURRED VISION: 0
SHORTNESS OF BREATH: 0
TROUBLE SWALLOWING: 0
VOICE CHANGE: 0
CHEST TIGHTNESS: 0
COLOR CHANGE: 0

## 2024-05-20 ASSESSMENT — PATIENT HEALTH QUESTIONNAIRE - PHQ9
SUM OF ALL RESPONSES TO PHQ QUESTIONS 1-9: 0
2. FEELING DOWN, DEPRESSED OR HOPELESS: NOT AT ALL
SUM OF ALL RESPONSES TO PHQ QUESTIONS 1-9: 0
SUM OF ALL RESPONSES TO PHQ QUESTIONS 1-9: 0
1. LITTLE INTEREST OR PLEASURE IN DOING THINGS: NOT AT ALL
SUM OF ALL RESPONSES TO PHQ QUESTIONS 1-9: 0
SUM OF ALL RESPONSES TO PHQ9 QUESTIONS 1 & 2: 0

## 2024-05-20 NOTE — PROGRESS NOTES
[facility]  [facility] PRIMARY CARE  [facility] MAIN FLOOR  [facility] [address]  Dept: [phone]  Dept Fax: [phone]    Franklin Vilchis (:  1945) is a 79 y.o. male,Established patient, here for evaluation of the following chief complaint(s):  Hypertension and Follow-up (6 month follow up )    Franklin Vilchis is a 79-year-old male here today for routine follow-up, last seen 2023.  Due to repeat colonoscopy in January, however he has not rescheduled.  He follows with nephrology, Dr. Roque at [facility] who had stopped Norvasc and started nifedipine 60 mg      Assessment & Plan   ASSESSMENT/PLAN:  1. Mixed hyperlipidemia  -     Lipid, Fasting; Future  -     atorvastatin (LIPITOR) 40 MG tablet; Take 1 tablet by mouth daily, Disp-90 tablet, R-0Normal  2. Stage 3b chronic kidney disease (HCC)  3. Essential hypertension  -     losartan (COZAAR) 100 MG tablet; Take 1 tablet by mouth daily, Disp-90 tablet, R-1Normal  -     atenolol (TENORMIN) 25 MG tablet; Take 1 tablet by mouth daily, Disp-90 tablet, R-0Normal  4. Acute gout of left foot, unspecified cause  -     methylPREDNISolone (MEDROL DOSEPACK) 4 MG tablet; Take by mouth., Disp-1 kit, R-0Normal  5. Medication refill  -     atorvastatin (LIPITOR) 40 MG tablet; Take 1 tablet by mouth daily, Disp-90 tablet, R-0Normal    Given history of gout and current reported foot pain that appears appropriate to treat with Medrol Dosepak for acute gout flare, avoid NSAIDs due to stage IIIb CKD.  Blood pressure remains elevated, increase losartan today and patient will keep follow-up with nephrology in .  We discussed recommendations for repeat colonoscopy, he is scheduled with GI on .    Return in about 6 months (around 2024) for AMW.         Subjective   SUBJECTIVE/OBJECTIVE:  Taking all meds,checks BP at home. BP runs 130-140 SBP at home     States he has hx of gout, currently causing pain at the

## 2024-05-29 LAB
CHOLESTEROL, TOTAL: 156 MG/DL
CHOLESTEROL/HDL RATIO: 3.9
HDLC SERPL-MCNC: 40 MG/DL
LDL CHOLESTEROL: 86 MG/DL
TRIGL SERPL-MCNC: 152 MG/DL
VLDLC SERPL CALC-MCNC: 30 MG/DL

## 2024-06-11 ENCOUNTER — OFFICE VISIT (OUTPATIENT)
Dept: GASTROENTEROLOGY | Age: 79
End: 2024-06-11
Payer: COMMERCIAL

## 2024-06-11 VITALS
DIASTOLIC BLOOD PRESSURE: 74 MMHG | WEIGHT: 203.8 LBS | BODY MASS INDEX: 30.1 KG/M2 | OXYGEN SATURATION: 98 % | SYSTOLIC BLOOD PRESSURE: 157 MMHG | TEMPERATURE: 98.1 F | HEART RATE: 67 BPM

## 2024-06-11 DIAGNOSIS — Z86.010 HX OF COLONIC POLYPS: Primary | ICD-10-CM

## 2024-06-11 DIAGNOSIS — K57.90 DIVERTICULOSIS: ICD-10-CM

## 2024-06-11 DIAGNOSIS — K64.8 OTHER HEMORRHOIDS: ICD-10-CM

## 2024-06-11 PROCEDURE — 1123F ACP DISCUSS/DSCN MKR DOCD: CPT | Performed by: INTERNAL MEDICINE

## 2024-06-11 PROCEDURE — 3077F SYST BP >= 140 MM HG: CPT | Performed by: INTERNAL MEDICINE

## 2024-06-11 PROCEDURE — 99214 OFFICE O/P EST MOD 30 MIN: CPT | Performed by: INTERNAL MEDICINE

## 2024-06-11 PROCEDURE — 3078F DIAST BP <80 MM HG: CPT | Performed by: INTERNAL MEDICINE

## 2024-06-11 ASSESSMENT — ENCOUNTER SYMPTOMS
COUGH: 0
RECTAL PAIN: 0
VOICE CHANGE: 0
VOMITING: 0
NAUSEA: 0
BLOOD IN STOOL: 0
ANAL BLEEDING: 0
ABDOMINAL DISTENTION: 0
DIARRHEA: 0
WHEEZING: 0
CHOKING: 0
CONSTIPATION: 0
ABDOMINAL PAIN: 0
TROUBLE SWALLOWING: 0
SORE THROAT: 0

## 2024-06-11 NOTE — PROGRESS NOTES
Connections: Not on file   Intimate Partner Violence: Not on file   Housing Stability: Unknown (5/20/2024)    Housing Stability Vital Sign     Unable to Pay for Housing in the Last Year: Not on file     Number of Places Lived in the Last Year: Not on file     Unstable Housing in the Last Year: No       REVIEW OF SYSTEMS: A 12-point review of systemswas obtained and pertinent positives and negatives were enumerated above in the history of present illness. All other reviewed systems / symptoms were negative.    Review of Systems   Constitutional:  Negative for appetite change, fatigue and unexpected weight change.   HENT:  Negative for sore throat, trouble swallowing and voice change.    Respiratory:  Negative for cough, choking and wheezing.    Cardiovascular:  Negative for chest pain, palpitations and leg swelling.   Gastrointestinal:  Negative for abdominal distention, abdominal pain, anal bleeding, blood in stool, constipation, diarrhea, nausea, rectal pain and vomiting.   Genitourinary:  Negative for flank pain.   Allergic/Immunologic: Negative for food allergies and immunocompromised state.   Neurological:  Negative for dizziness, speech difficulty, weakness, light-headedness and headaches.   Hematological:  Does not bruise/bleed easily.   Psychiatric/Behavioral:  Negative for sleep disturbance.            LABORATORY DATA: Reviewed  Lab Results   Component Value Date    WBC 7.60 05/29/2024    HGB 12.4 (L) 05/29/2024    HCT 38.4 (L) 05/29/2024    MCV 84.4 05/29/2024     05/29/2024     05/29/2024    K 4.7 05/29/2024     05/29/2024    CO2 25 05/29/2024    BUN 28 (H) 05/29/2024    CREATININE 2.23 (H) 05/29/2024    LABALBU 100 (H) 05/29/2024    BILITOT 0.6 05/29/2024    ALKPHOS 78 05/29/2024    AST 31 05/29/2024    ALT 32 05/29/2024    INR 1.0 02/10/2015         Lab Results   Component Value Date    RBC 4.55 (L) 05/29/2024    HGB 12.4 (L) 05/29/2024    MCV 84.4 05/29/2024    MCH 27.3 05/29/2024

## 2024-06-11 NOTE — TELEPHONE ENCOUNTER
Procedure scheduled/Dr Cancino  Procedure:Colonoscopy   Dx: Hx of colonic polyps; Other hemorrhoids; Diverticulosis    Date:10/11/2024  Time:9:00 am /Arrive: 7:30 am  Hospital:[facility] phone call:real  Bowel Prep instructions given:Vickie  In office/via phone: office  Clearance needed:none

## 2024-06-12 RX ORDER — BISACODYL 5 MG/1
TABLET, DELAYED RELEASE ORAL
Qty: 4 TABLET | Refills: 0 | Status: SHIPPED | OUTPATIENT
Start: 2024-06-12

## 2024-06-12 RX ORDER — POLYETHYLENE GLYCOL 3350, SODIUM SULFATE ANHYDROUS, SODIUM BICARBONATE, SODIUM CHLORIDE, POTASSIUM CHLORIDE 236; 22.74; 6.74; 5.86; 2.97 G/4L; G/4L; G/4L; G/4L; G/4L
POWDER, FOR SOLUTION ORAL
Qty: 1 EACH | Refills: 0 | Status: SHIPPED | OUTPATIENT
Start: 2024-06-12

## 2024-06-12 RX ORDER — POLYETHYLENE GLYCOL 3350 17 G/17G
POWDER, FOR SOLUTION ORAL
Qty: 116 G | Refills: 0 | Status: SHIPPED | OUTPATIENT
Start: 2024-06-12

## 2024-06-17 DIAGNOSIS — I10 ESSENTIAL HYPERTENSION: ICD-10-CM

## 2024-06-17 DIAGNOSIS — Z76.0 MEDICATION REFILL: ICD-10-CM

## 2024-06-17 RX ORDER — AMLODIPINE BESYLATE 10 MG/1
10 TABLET ORAL DAILY
Qty: 90 TABLET | Refills: 0 | OUTPATIENT
Start: 2024-06-17

## 2024-08-16 DIAGNOSIS — I10 ESSENTIAL HYPERTENSION: ICD-10-CM

## 2024-08-16 DIAGNOSIS — E78.2 MIXED HYPERLIPIDEMIA: ICD-10-CM

## 2024-08-16 DIAGNOSIS — Z76.0 MEDICATION REFILL: ICD-10-CM

## 2024-08-16 RX ORDER — ATENOLOL 25 MG/1
25 TABLET ORAL DAILY
Qty: 90 TABLET | Refills: 1 | Status: SHIPPED | OUTPATIENT
Start: 2024-08-16 | End: 2025-08-16

## 2024-08-16 RX ORDER — ATORVASTATIN CALCIUM 40 MG/1
40 TABLET, FILM COATED ORAL DAILY
Qty: 90 TABLET | Refills: 1 | Status: SHIPPED | OUTPATIENT
Start: 2024-08-16 | End: 2025-08-16

## 2024-10-11 ENCOUNTER — ANESTHESIA EVENT (OUTPATIENT)
Dept: OPERATING ROOM | Age: 79
End: 2024-10-11
Payer: MEDICARE

## 2024-10-11 ENCOUNTER — HOSPITAL ENCOUNTER (OUTPATIENT)
Age: 79
Setting detail: OUTPATIENT SURGERY
Discharge: HOME | End: 2024-10-11
Attending: INTERNAL MEDICINE | Admitting: INTERNAL MEDICINE
Payer: MEDICARE

## 2024-10-11 ENCOUNTER — ANESTHESIA (OUTPATIENT)
Dept: OPERATING ROOM | Age: 79
End: 2024-10-11
Payer: MEDICARE

## 2024-10-11 VITALS
RESPIRATION RATE: 18 BRPM | HEART RATE: 73 BPM | OXYGEN SATURATION: 100 % | HEIGHT: 69 IN | DIASTOLIC BLOOD PRESSURE: 86 MMHG | WEIGHT: 192.7 LBS | BODY MASS INDEX: 28.54 KG/M2 | TEMPERATURE: 96.8 F | SYSTOLIC BLOOD PRESSURE: 145 MMHG

## 2024-10-11 PROCEDURE — 2709999900 HC NON-CHARGEABLE SUPPLY: Performed by: INTERNAL MEDICINE

## 2024-10-11 PROCEDURE — 3700000001 HC ADD 15 MINUTES (ANESTHESIA): Performed by: INTERNAL MEDICINE

## 2024-10-11 PROCEDURE — 3700000000 HC ANESTHESIA ATTENDED CARE: Performed by: INTERNAL MEDICINE

## 2024-10-11 PROCEDURE — 3609027000 HC COLONOSCOPY: Performed by: INTERNAL MEDICINE

## 2024-10-11 PROCEDURE — G0105 COLORECTAL SCRN; HI RISK IND: HCPCS | Performed by: INTERNAL MEDICINE

## 2024-10-11 PROCEDURE — 2500000003 HC RX 250 WO HCPCS: Performed by: NURSE ANESTHETIST, CERTIFIED REGISTERED

## 2024-10-11 PROCEDURE — 6360000002 HC RX W HCPCS: Performed by: NURSE ANESTHETIST, CERTIFIED REGISTERED

## 2024-10-11 PROCEDURE — 7100000011 HC PHASE II RECOVERY - ADDTL 15 MIN: Performed by: INTERNAL MEDICINE

## 2024-10-11 PROCEDURE — 2580000003 HC RX 258: Performed by: ANESTHESIOLOGY

## 2024-10-11 PROCEDURE — 7100000010 HC PHASE II RECOVERY - FIRST 15 MIN: Performed by: INTERNAL MEDICINE

## 2024-10-11 PROCEDURE — 2580000003 HC RX 258: Performed by: NURSE ANESTHETIST, CERTIFIED REGISTERED

## 2024-10-11 RX ORDER — SODIUM CHLORIDE, SODIUM LACTATE, POTASSIUM CHLORIDE, CALCIUM CHLORIDE 600; 310; 30; 20 MG/100ML; MG/100ML; MG/100ML; MG/100ML
INJECTION, SOLUTION INTRAVENOUS
Status: DISCONTINUED | OUTPATIENT
Start: 2024-10-11 | End: 2024-10-11 | Stop reason: SDUPTHER

## 2024-10-11 RX ORDER — LIDOCAINE HYDROCHLORIDE 10 MG/ML
INJECTION, SOLUTION EPIDURAL; INFILTRATION; INTRACAUDAL; PERINEURAL
Status: DISCONTINUED | OUTPATIENT
Start: 2024-10-11 | End: 2024-10-11 | Stop reason: SDUPTHER

## 2024-10-11 RX ORDER — PROPOFOL 10 MG/ML
INJECTION, EMULSION INTRAVENOUS
Status: DISCONTINUED | OUTPATIENT
Start: 2024-10-11 | End: 2024-10-11 | Stop reason: SDUPTHER

## 2024-10-11 RX ORDER — SODIUM CHLORIDE, SODIUM LACTATE, POTASSIUM CHLORIDE, CALCIUM CHLORIDE 600; 310; 30; 20 MG/100ML; MG/100ML; MG/100ML; MG/100ML
INJECTION, SOLUTION INTRAVENOUS CONTINUOUS
Status: DISCONTINUED | OUTPATIENT
Start: 2024-10-11 | End: 2024-10-11 | Stop reason: HOSPADM

## 2024-10-11 RX ORDER — SODIUM CHLORIDE 9 MG/ML
INJECTION, SOLUTION INTRAVENOUS CONTINUOUS
Status: DISCONTINUED | OUTPATIENT
Start: 2024-10-11 | End: 2024-10-11 | Stop reason: HOSPADM

## 2024-10-11 RX ORDER — LIDOCAINE HYDROCHLORIDE 10 MG/ML
1 INJECTION, SOLUTION EPIDURAL; INFILTRATION; INTRACAUDAL; PERINEURAL
Status: DISCONTINUED | OUTPATIENT
Start: 2024-10-12 | End: 2024-10-11 | Stop reason: HOSPADM

## 2024-10-11 RX ADMIN — SODIUM CHLORIDE, POTASSIUM CHLORIDE, SODIUM LACTATE AND CALCIUM CHLORIDE: 600; 310; 30; 20 INJECTION, SOLUTION INTRAVENOUS at 08:04

## 2024-10-11 RX ADMIN — SODIUM CHLORIDE, POTASSIUM CHLORIDE, SODIUM LACTATE AND CALCIUM CHLORIDE: 600; 310; 30; 20 INJECTION, SOLUTION INTRAVENOUS at 08:33

## 2024-10-11 RX ADMIN — PROPOFOL 120 MCG/KG/MIN: 10 INJECTION, EMULSION INTRAVENOUS at 08:35

## 2024-10-11 RX ADMIN — PROPOFOL 50 MG: 10 INJECTION, EMULSION INTRAVENOUS at 08:34

## 2024-10-11 RX ADMIN — LIDOCAINE HYDROCHLORIDE 50 MG: 10 INJECTION, SOLUTION EPIDURAL; INFILTRATION; INTRACAUDAL; PERINEURAL at 08:34

## 2024-10-11 ASSESSMENT — PAIN - FUNCTIONAL ASSESSMENT: PAIN_FUNCTIONAL_ASSESSMENT: 0-10

## 2024-10-11 NOTE — OP NOTE
PROCEDURE NOTE    DATE OF PROCEDURE: 10/11/2024    SURGEON: Yokasta Cancino MD  Facility : [facility]  ASSISTANT: None  Anesthesia: MAC  PREOPERATIVE DIAGNOSIS:   History of polyps    POSTOPERATIVE DIAGNOSIS: as described below    OPERATION: Total colonoscopy     ANESTHESIA: Moderate Sedation    ESTIMATED BLOOD LOSS: less than 50     COMPLICATIONS: None.     SPECIMENS:  Was Not Obtained    HISTORY: The patient is a 79 y.o. year old male with history of above preop diagnosis.  I recommended colonoscopy with possible biopsy or polypectomy and I explained the risk, benefits, expected outcome, and alternatives to the procedure.  Risks included but are not limited to bleeding, infection, respiratory distress, hypotension, and perforation of the colon and possibility of missing a lesion.  The patient understands and is in agreement.        The patient was counseled at length about the risks of otf Covid-19 during their perioperative period and any recovery window from their procedure.  The patient was made aware that otf Covid-19  may worsen their prognosis for recovering from their procedure  and lend to a higher morbidity and/or mortality risk.  All material risks, benefits, and reasonable alternatives including postponing the procedure were discussed. The patient does wish to proceed with the procedure at this time.       PROCEDURE: The patient was given IV conscious sedation.  The patient's SPO2 remained above 90% throughout the procedure.     The colonoscope was inserted per rectum and advanced under direct vision to the cecum without difficulty.      Post sedation note :The patient's SPO2 remained above 90% throughout the procedure.the vital signs remained stable , and no immediate complication form the procedure noted, patient will be ready for d/c when criteria is met .        The prep was fair.      Findings:  Terminal ileum: normal    Cecum/Ascending colon: normal    Transverse colon:

## 2024-10-11 NOTE — H&P
History and Physical Service   [facility]    HISTORY AND PHYSICAL EXAMINATION            Date of Evaluation: 10/11/2024  Patient name:  Franklin Vilchis  MRN:   [id number]  YOB: 1945  PCP:    Inge Bradshaw APRN - CNP    History Obtained From:     Patient, medical records     History of Present Illness:     This is Franklin Vilchis a 79 y.o. male who presents today for a COLONOSCOPY DIAGNOSTIC by Yokasta Cancino MD for Hx of colonic polyps; Diverticulosis; Other hemorrhoids. The patient has known hx polyps, hemorrhoid and diverticular dx who returns for his surveillance colonoscopy.  He completed the prep as directed until watery clear. Last colonoscopy 1/20/23 by Dr Cancino (OP/Path reports below)  No FH colon cancer or polyps. Patient denies bowel changes, bloody tarry stools, diarrhea alternating with constipation, abdominal pain or unexplained weight loss. Today no fever, chills, cough, wheezing, increased SOB or chest pain. No DM  No blood thinning medication     1/20/2023 11:54 AM COLONOSCOPY WITH BIOPSY    Yokasta Cancino MD         Findings:poor prep  Terminal ileum: normal  - 2 large polyps from the right colon the larger ones was 12 mm removed with snare  - 4 polyps from the sigmoid colon the largest was 11 mm removed with hot snare  - Few diverticuli  - Very engorged veins with rectal hemorrhoids and varices  - No other abnormality but the prep was very poor with large amount of liquid stool     Repeat colonoscopy [id number] because of the number of the polyps, the poor preparation, and the positive Cologuard  Electronically signed by Yokasta Cancino MD  on 1/20/2023 at 12:34 PM     Past Medical History:     Past Medical History:   Diagnosis Date    ARF (acute renal failure) (HCC) 11/12/2014    WAS ON DIALYSIS FOR 2 MONTHS    Gout of left foot     Hyperlipidemia     Hypertension         Past Surgical History:     Past Surgical History:   Procedure Laterality Date    BACK SURGERY

## 2024-10-11 NOTE — DISCHARGE INSTRUCTIONS
Lifestyle and dietary modifications as discussed  F/U Biopsies  F/U In OfficeYes  Discussed with the family  Repeat colonoscopy [id number]

## 2024-10-11 NOTE — ANESTHESIA POSTPROCEDURE EVALUATION
Department of Anesthesiology  Postprocedure Note    Patient: Franklin Vilchis  MRN: [id number]  YOB: 1945  Date of evaluation: 10/11/2024    Procedure Summary       Date: 10/11/24 Room / Location: [address]    Anesthesia Start: 0833 Anesthesia Stop: 0859    Procedure: COLONOSCOPY DIAGNOSTIC Diagnosis:       Hx of colonic polyps      Diverticulosis      Other hemorrhoids      (Hx of colonic polyps [Z86.010])      (Diverticulosis [K57.90])      (Other hemorrhoids [K64.8])    Surgeons: Yokasta Cancino MD Responsible Provider: Enoc Silva DO    Anesthesia Type: MAC ASA Status: 2            Anesthesia Type: No value filed.    Kash Phase I: Kash Score: 10    Kash Phase II: Kash Score: 9    Anesthesia Post Evaluation    Patient location during evaluation: PACU  Patient participation: complete - patient participated  Level of consciousness: awake and alert  Airway patency: patent  Nausea & Vomiting: no nausea and no vomiting  Cardiovascular status: hemodynamically stable  Respiratory status: acceptable  Hydration status: stable  Pain management: adequate    No notable events documented.

## 2024-10-11 NOTE — ANESTHESIA PRE PROCEDURE
Department of Anesthesiology  Preprocedure Note       Name:  Franklin Vilchis   Age:  79 y.o.  :  1945                                          MRN:  [id number]         Date:  10/11/2024      Surgeon: Surgeon(s):  Yokasta Cancino MD    Procedure: Procedure(s):  COLONOSCOPY DIAGNOSTIC    Medications prior to admission:   Prior to Admission medications    Medication Sig Start Date End Date Taking? Authorizing Provider   atenolol (TENORMIN) 25 MG tablet Take 1 tablet by mouth daily 24 Yes Inge Bradshaw APRN - CNP   atorvastatin (LIPITOR) 40 MG tablet Take 1 tablet by mouth daily 24 Yes Inge Bradshaw APRN - CNP   polyethylene glycol (GLYCOLAX) 17 GM/SCOOP powder Take one dose a day for 7 days prior to colonoscopy 24  Yes Yokasta Cancino MD   bisacodyl 5 MG EC tablet Take as directed by physician office for bowel prep 24  Yes Yokasta Cancino MD   losartan (COZAAR) 100 MG tablet Take 1 tablet by mouth daily 24 Yes Inge Bradshaw APRN - CNP   NIFEdipine (PROCARDIA XL) 60 MG extended release tablet Take 1 tablet by mouth daily   Yes Provider, MD Frank   polyethylene glycol (GOLYTELY) 236 g solution Take as directed by physician office for bowel prep  Patient not taking: Reported on 10/11/2024 6/12/24   Yokasta Cancino MD       Current medications:    Current Facility-Administered Medications   Medication Dose Route Frequency Provider Last Rate Last Admin   • [START ON 10/12/2024] lidocaine PF 1 % injection 1 mL  1 mL IntraDERmal Once PRN Nelson Bear MD       • 0.9 % sodium chloride infusion   IntraVENous Continuous Nelson Bear MD       • lactated ringers IV soln infusion   IntraVENous Continuous Nelson Bear  mL/hr at 10/11/24 0804 New Bag at 10/11/24 0804       Allergies:    Allergies   Allergen Reactions   • Lisinopril Swelling       Problem List:    Patient Active Problem List   Diagnosis Code   • Sprain, lumbosacral S33.9XXA   • Unstable gait R26.81

## 2024-11-18 DIAGNOSIS — I10 ESSENTIAL HYPERTENSION: ICD-10-CM

## 2024-11-18 RX ORDER — LOSARTAN POTASSIUM 100 MG/1
100 TABLET ORAL DAILY
Qty: 90 TABLET | Refills: 0 | Status: SHIPPED | OUTPATIENT
Start: 2024-11-18 | End: 2025-11-18

## 2024-11-20 ENCOUNTER — OFFICE VISIT (OUTPATIENT)
Dept: PRIMARY CARE CLINIC | Age: 79
End: 2024-11-20

## 2024-11-20 VITALS
WEIGHT: 200 LBS | SYSTOLIC BLOOD PRESSURE: 133 MMHG | OXYGEN SATURATION: 99 % | HEART RATE: 75 BPM | TEMPERATURE: 98.1 F | DIASTOLIC BLOOD PRESSURE: 65 MMHG | HEIGHT: 69 IN | BODY MASS INDEX: 29.62 KG/M2

## 2024-11-20 DIAGNOSIS — N18.32 STAGE 3B CHRONIC KIDNEY DISEASE (HCC): ICD-10-CM

## 2024-11-20 DIAGNOSIS — Z71.89 ACP (ADVANCE CARE PLANNING): ICD-10-CM

## 2024-11-20 DIAGNOSIS — I10 ESSENTIAL HYPERTENSION: ICD-10-CM

## 2024-11-20 DIAGNOSIS — E78.2 MIXED HYPERLIPIDEMIA: ICD-10-CM

## 2024-11-20 DIAGNOSIS — Z00.00 MEDICARE ANNUAL WELLNESS VISIT, SUBSEQUENT: Primary | ICD-10-CM

## 2024-11-20 ASSESSMENT — LIFESTYLE VARIABLES
HOW MANY STANDARD DRINKS CONTAINING ALCOHOL DO YOU HAVE ON A TYPICAL DAY: PATIENT DOES NOT DRINK
HOW OFTEN DO YOU HAVE A DRINK CONTAINING ALCOHOL: NEVER

## 2024-11-20 ASSESSMENT — PATIENT HEALTH QUESTIONNAIRE - PHQ9
2. FEELING DOWN, DEPRESSED OR HOPELESS: NOT AT ALL
SUM OF ALL RESPONSES TO PHQ QUESTIONS 1-9: 0
SUM OF ALL RESPONSES TO PHQ9 QUESTIONS 1 & 2: 0
SUM OF ALL RESPONSES TO PHQ QUESTIONS 1-9: 0
SUM OF ALL RESPONSES TO PHQ QUESTIONS 1-9: 0
1. LITTLE INTEREST OR PLEASURE IN DOING THINGS: NOT AT ALL
SUM OF ALL RESPONSES TO PHQ QUESTIONS 1-9: 0

## 2024-11-20 NOTE — PROGRESS NOTES
Medicare Annual Wellness Visit    Franklin Vilchis is here for Medicare AWV    Assessment & Plan   Medicare annual wellness visit, subsequent  Essential hypertension  Mixed hyperlipidemia  Stage 3b chronic kidney disease (HCC)  ACP (advance care planning)  -     [place] Advanced Care Planning (16-30 minutes) [92570]    Blood pressure remains at goal.  Reviewed vaccine counseling and did encourage patient to obtain shingles vaccine from local pharmacy    Recommendations for Preventive Services Due: see orders and patient instructions/AVS.  Recommended screening schedule for the next 5-10 years is provided to the patient in written form: see Patient Instructions/AVS.     Return in 6 months (on 5/20/2025) for HTN.     Subjective   The following acute and/or chronic problems were also addressed today:  Patient doing well, states that he is following with his kidney doctor with no acute concerns.  No concerns for medication adverse reactions.    Patient's complete Health Risk Assessment and screening values have been reviewed and are found in Flowsheets. The following problems were reviewed today and where indicated follow up appointments were made and/or referrals ordered.    Positive Risk Factor Screenings with Interventions:                   Vision Screen:  Do you have difficulty driving, watching TV, or doing any of your daily activities because of your eyesight?: No  Have you had an eye exam within the past year?: (!) No  Interventions:   Patient encouraged to make appointment with their eye specialist    Safety:  Do you have non-slip mats or non-slip surfaces or shower bars or grab bars in your shower or bathtub?: (!) No  Interventions:  See AVS for additional education material     Advanced Directives:  Do you have a Living Will?: (!) No    Intervention:  has NO advanced directive - information provided    Advance Care Planning   Discussed the patient’s choices for care and treatment preferences in case of a health

## 2024-12-10 ENCOUNTER — OFFICE VISIT (OUTPATIENT)
Dept: GASTROENTEROLOGY | Age: 79
End: 2024-12-10
Payer: MEDICARE

## 2024-12-10 VITALS
OXYGEN SATURATION: 98 % | HEART RATE: 75 BPM | DIASTOLIC BLOOD PRESSURE: 68 MMHG | SYSTOLIC BLOOD PRESSURE: 140 MMHG | WEIGHT: 200 LBS | TEMPERATURE: 97.8 F | RESPIRATION RATE: 16 BRPM | BODY MASS INDEX: 29.53 KG/M2

## 2024-12-10 DIAGNOSIS — D64.9 ANEMIA, UNSPECIFIED TYPE: Primary | ICD-10-CM

## 2024-12-10 DIAGNOSIS — Z86.0100 HX OF COLONIC POLYPS: ICD-10-CM

## 2024-12-10 DIAGNOSIS — K57.90 DIVERTICULOSIS: ICD-10-CM

## 2024-12-10 DIAGNOSIS — K64.8 OTHER HEMORRHOIDS: ICD-10-CM

## 2024-12-10 PROCEDURE — 1036F TOBACCO NON-USER: CPT | Performed by: INTERNAL MEDICINE

## 2024-12-10 PROCEDURE — G8427 DOCREV CUR MEDS BY ELIG CLIN: HCPCS | Performed by: INTERNAL MEDICINE

## 2024-12-10 PROCEDURE — 1126F AMNT PAIN NOTED NONE PRSNT: CPT | Performed by: INTERNAL MEDICINE

## 2024-12-10 PROCEDURE — G8417 CALC BMI ABV UP PARAM F/U: HCPCS | Performed by: INTERNAL MEDICINE

## 2024-12-10 PROCEDURE — 3078F DIAST BP <80 MM HG: CPT | Performed by: INTERNAL MEDICINE

## 2024-12-10 PROCEDURE — 99214 OFFICE O/P EST MOD 30 MIN: CPT | Performed by: INTERNAL MEDICINE

## 2024-12-10 PROCEDURE — 1123F ACP DISCUSS/DSCN MKR DOCD: CPT | Performed by: INTERNAL MEDICINE

## 2024-12-10 PROCEDURE — 1159F MED LIST DOCD IN RCRD: CPT | Performed by: INTERNAL MEDICINE

## 2024-12-10 PROCEDURE — 3077F SYST BP >= 140 MM HG: CPT | Performed by: INTERNAL MEDICINE

## 2024-12-10 PROCEDURE — G8484 FLU IMMUNIZE NO ADMIN: HCPCS | Performed by: INTERNAL MEDICINE

## 2024-12-10 ASSESSMENT — ENCOUNTER SYMPTOMS
SORE THROAT: 0
WHEEZING: 0
VOMITING: 0
ANAL BLEEDING: 0
COUGH: 0
DIARRHEA: 0
TROUBLE SWALLOWING: 0
ABDOMINAL PAIN: 0
BLOOD IN STOOL: 0
VOICE CHANGE: 0
RECTAL PAIN: 0
NAUSEA: 0
ABDOMINAL DISTENTION: 0
CHOKING: 0
CONSTIPATION: 0

## 2024-12-10 NOTE — PROGRESS NOTES
immunocompromised state.   Neurological:  Negative for dizziness, speech difficulty, weakness, light-headedness and headaches.   Hematological:  Does not bruise/bleed easily.   Psychiatric/Behavioral:  Negative for sleep disturbance.            LABORATORY DATA: Reviewed  Lab Results   Component Value Date    WBC 6.30 10/16/2024    HGB 11.9 (L) 10/16/2024    HCT 36.9 (L) 10/16/2024    MCV 82.7 10/16/2024     10/16/2024     10/16/2024    K 4.5 10/16/2024     10/16/2024    CO2 27 10/16/2024    BUN 19 10/16/2024    CREATININE 2.19 (H) 10/16/2024    LABALBU 30 (H) 10/16/2024    BILITOT 0.7 10/16/2024    ALKPHOS 87 10/16/2024    AST 34 10/16/2024    ALT 29 10/16/2024    INR 1.0 02/10/2015         Lab Results   Component Value Date    RBC 4.46 (L) 10/16/2024    HGB 11.9 (L) 10/16/2024    MCV 82.7 10/16/2024    MCH 26.7 (L) 10/16/2024    MCHC 32.2 10/16/2024    RDW 43.8 11/09/2021    MPV 10.8 01/03/2020    BASOPCT 0.5 10/16/2024    LYMPHSABS 1.69 10/16/2024    MONOSABS 0.48 10/16/2024    NEUTROABS 3.85 10/16/2024    EOSABS 0.24 10/16/2024    BASOSABS 0.03 10/16/2024         DIAGNOSTIC TESTING:     No results found.       PHYSICAL EXAMINATION: Vital signs reviewed per the nursing documentation.     BP (!) 140/68   Pulse 75   Temp 97.8 °F (36.6 °C) (Tympanic)   Resp 16   Wt 90.7 kg (200 lb)   SpO2 98%   BMI 29.53 kg/m²   Body mass index is 29.53 kg/m².   Physical Exam  Vitals and nursing note reviewed.   Constitutional:       General: He is not in acute distress.     Appearance: He is well-developed. He is not diaphoretic.   HENT:      Head: Normocephalic and atraumatic.   Eyes:      General: No scleral icterus.     Pupils: Pupils are equal, round, and reactive to light.   Neck:      Thyroid: No thyromegaly.      Vascular: No JVD.      Trachea: No tracheal deviation.   Cardiovascular:      Rate and Rhythm: Normal rate and regular rhythm.      Heart sounds: Normal heart sounds. No murmur

## 2024-12-12 LAB
FOLATE: 7.2 NG/ML
IRON % SATURATION: 19 %
IRON: 57 UG/DL
TISSUE TRANSGLUTAMINASE IGA: 0.7 U/ML
TOTAL IRON BINDING CAPACITY: 300 UG/DL
VITAMIN B-12: 396 PG/ML

## 2024-12-13 DIAGNOSIS — D64.9 ANEMIA, UNSPECIFIED TYPE: ICD-10-CM

## 2025-01-25 DIAGNOSIS — E78.2 MIXED HYPERLIPIDEMIA: ICD-10-CM

## 2025-01-25 DIAGNOSIS — I10 ESSENTIAL HYPERTENSION: ICD-10-CM

## 2025-01-25 DIAGNOSIS — Z76.0 MEDICATION REFILL: ICD-10-CM

## 2025-01-27 RX ORDER — ATORVASTATIN CALCIUM 40 MG/1
40 TABLET, FILM COATED ORAL DAILY
Qty: 90 TABLET | Refills: 1 | Status: SHIPPED | OUTPATIENT
Start: 2025-01-27 | End: 2026-01-27

## 2025-01-27 RX ORDER — LOSARTAN POTASSIUM 100 MG/1
100 TABLET ORAL DAILY
Qty: 90 TABLET | Refills: 1 | Status: SHIPPED | OUTPATIENT
Start: 2025-01-27 | End: 2026-01-27

## 2025-03-03 DIAGNOSIS — I10 ESSENTIAL HYPERTENSION: ICD-10-CM

## 2025-03-04 RX ORDER — ATENOLOL 25 MG/1
25 TABLET ORAL DAILY
Qty: 90 TABLET | Refills: 1 | Status: SHIPPED | OUTPATIENT
Start: 2025-03-04 | End: 2026-03-04

## 2025-04-15 ENCOUNTER — OFFICE VISIT (OUTPATIENT)
Dept: GASTROENTEROLOGY | Age: 80
End: 2025-04-15
Payer: MEDICARE

## 2025-04-15 VITALS
HEART RATE: 69 BPM | OXYGEN SATURATION: 99 % | DIASTOLIC BLOOD PRESSURE: 72 MMHG | WEIGHT: 204 LBS | RESPIRATION RATE: 16 BRPM | BODY MASS INDEX: 30.13 KG/M2 | TEMPERATURE: 97.9 F | SYSTOLIC BLOOD PRESSURE: 155 MMHG

## 2025-04-15 DIAGNOSIS — Z86.0100 HX OF COLONIC POLYPS: ICD-10-CM

## 2025-04-15 DIAGNOSIS — D64.9 ANEMIA, UNSPECIFIED TYPE: Primary | ICD-10-CM

## 2025-04-15 DIAGNOSIS — K64.8 OTHER HEMORRHOIDS: ICD-10-CM

## 2025-04-15 DIAGNOSIS — K57.90 DIVERTICULOSIS: ICD-10-CM

## 2025-04-15 PROCEDURE — 3077F SYST BP >= 140 MM HG: CPT | Performed by: INTERNAL MEDICINE

## 2025-04-15 PROCEDURE — G8417 CALC BMI ABV UP PARAM F/U: HCPCS | Performed by: INTERNAL MEDICINE

## 2025-04-15 PROCEDURE — 99214 OFFICE O/P EST MOD 30 MIN: CPT | Performed by: INTERNAL MEDICINE

## 2025-04-15 PROCEDURE — 3078F DIAST BP <80 MM HG: CPT | Performed by: INTERNAL MEDICINE

## 2025-04-15 PROCEDURE — 1036F TOBACCO NON-USER: CPT | Performed by: INTERNAL MEDICINE

## 2025-04-15 PROCEDURE — 1126F AMNT PAIN NOTED NONE PRSNT: CPT | Performed by: INTERNAL MEDICINE

## 2025-04-15 PROCEDURE — G8427 DOCREV CUR MEDS BY ELIG CLIN: HCPCS | Performed by: INTERNAL MEDICINE

## 2025-04-15 PROCEDURE — 1123F ACP DISCUSS/DSCN MKR DOCD: CPT | Performed by: INTERNAL MEDICINE

## 2025-04-15 PROCEDURE — 1159F MED LIST DOCD IN RCRD: CPT | Performed by: INTERNAL MEDICINE

## 2025-04-15 ASSESSMENT — ENCOUNTER SYMPTOMS
BLOOD IN STOOL: 0
ANAL BLEEDING: 0
NAUSEA: 0
VOICE CHANGE: 0
SORE THROAT: 0
ABDOMINAL PAIN: 0
WHEEZING: 0
CHOKING: 0
RECTAL PAIN: 0
COUGH: 0
DIARRHEA: 0
VOMITING: 0
TROUBLE SWALLOWING: 0
ABDOMINAL DISTENTION: 0
CONSTIPATION: 0

## 2025-04-15 NOTE — PROGRESS NOTES
is no abdominal tenderness. There is no guarding or rebound.   Musculoskeletal:         General: No tenderness. Normal range of motion.      Cervical back: Normal range of motion and neck supple.   Skin:     General: Skin is warm.      Coloration: Skin is not pale.      Findings: No erythema or rash.      Comments: He is not diaphoretic   Neurological:      Mental Status: He is alert and oriented to person, place, and time.      Deep Tendon Reflexes: Reflexes are normal and symmetric.   Psychiatric:         Behavior: Behavior normal.         Thought Content: Thought content normal.         Judgment: Judgment normal.           IMPRESSION: Mr. Vilchis is a 79 y.o. male with      Diagnosis Orders   1. Anemia, unspecified type  Iron and TIBC    Hemoglobinopathy Evaluation    CBC    EGD      2. Hx of colonic polyps        3. Other hemorrhoids        4. Diverticulosis          Will proceed with above       Medication where reviewed, side effects from the GI medication were reviewed with the patient  We did refill the GI medications            Diet/life style/natural hx /complication of the dx were all explained in details   Past medical, past surgical, social history, psychiatric history, medications or allergies, all reviewed and  updated    Thank you for allowing me to participate in the care of Mr. Vilchis. For any further questions please do not hesitate to contact me.    I have reviewed and agree with the ROS entered by the MA/RN.     Note is dictated utilizing voice recognition software. Unfortunately this leads to occasional typographical errors. Please contact our office if you have any questions.  This note is created with the assistance of the speech recognition program. While intending to generate a document that actually reflects the content of the visit, it can still have some errors including those of syntax and sound-a-like substitutions which may escape proof reading. Actual meaning can be extrapolated by

## 2025-04-16 ENCOUNTER — TELEPHONE (OUTPATIENT)
Dept: GASTROENTEROLOGY | Age: 80
End: 2025-04-16

## 2025-04-16 ENCOUNTER — PREP FOR PROCEDURE (OUTPATIENT)
Dept: GASTROENTEROLOGY | Age: 80
End: 2025-04-16

## 2025-04-16 PROBLEM — D64.9 ANEMIA: Status: ACTIVE | Noted: 2025-04-16

## 2025-04-16 NOTE — TELEPHONE ENCOUNTER
Procedure scheduled/Dr Cancino  Procedure: EGD   Dx: anemia  Date: 8/22/25  Time: 2:00 pm arriving at 12:15 pm  Hospital: [facility] phone call:tbd  Bowel Prep instructions given: EGD  In office/via phone: office  Clearance needed:none          GLP-1: none

## 2025-05-16 ENCOUNTER — TELEPHONE (OUTPATIENT)
Dept: GASTROENTEROLOGY | Age: 80
End: 2025-05-16

## 2025-05-16 NOTE — TELEPHONE ENCOUNTER
Patient calling to clarify date of procedure with Dr. Cancino, and also if there is anything he has to do for the test, please call patient  at [phone] [facility]/sc

## 2025-05-19 SDOH — HEALTH STABILITY: PHYSICAL HEALTH: ON AVERAGE, HOW MANY DAYS PER WEEK DO YOU ENGAGE IN MODERATE TO STRENUOUS EXERCISE (LIKE A BRISK WALK)?: 5 DAYS

## 2025-05-19 SDOH — HEALTH STABILITY: PHYSICAL HEALTH: ON AVERAGE, HOW MANY MINUTES DO YOU ENGAGE IN EXERCISE AT THIS LEVEL?: 60 MIN

## 2025-05-19 ASSESSMENT — PATIENT HEALTH QUESTIONNAIRE - PHQ9
1. LITTLE INTEREST OR PLEASURE IN DOING THINGS: NOT AT ALL
SUM OF ALL RESPONSES TO PHQ QUESTIONS 1-9: 0
SUM OF ALL RESPONSES TO PHQ QUESTIONS 1-9: 0
2. FEELING DOWN, DEPRESSED OR HOPELESS: NOT AT ALL
SUM OF ALL RESPONSES TO PHQ QUESTIONS 1-9: 0
SUM OF ALL RESPONSES TO PHQ QUESTIONS 1-9: 0

## 2025-05-19 ASSESSMENT — LIFESTYLE VARIABLES
HOW MANY STANDARD DRINKS CONTAINING ALCOHOL DO YOU HAVE ON A TYPICAL DAY: PATIENT DOES NOT DRINK
HOW OFTEN DO YOU HAVE A DRINK CONTAINING ALCOHOL: 1
HOW OFTEN DO YOU HAVE A DRINK CONTAINING ALCOHOL: NEVER
HOW OFTEN DO YOU HAVE SIX OR MORE DRINKS ON ONE OCCASION: 1
HOW MANY STANDARD DRINKS CONTAINING ALCOHOL DO YOU HAVE ON A TYPICAL DAY: 0

## 2025-05-20 ENCOUNTER — OFFICE VISIT (OUTPATIENT)
Dept: PRIMARY CARE CLINIC | Age: 80
End: 2025-05-20
Payer: MEDICARE

## 2025-05-20 VITALS
HEIGHT: 69 IN | SYSTOLIC BLOOD PRESSURE: 143 MMHG | BODY MASS INDEX: 30.51 KG/M2 | TEMPERATURE: 97 F | WEIGHT: 206 LBS | DIASTOLIC BLOOD PRESSURE: 68 MMHG | OXYGEN SATURATION: 100 % | HEART RATE: 70 BPM

## 2025-05-20 DIAGNOSIS — Z00.00 MEDICARE ANNUAL WELLNESS VISIT, SUBSEQUENT: Primary | ICD-10-CM

## 2025-05-20 DIAGNOSIS — Z71.89 ACP (ADVANCE CARE PLANNING): ICD-10-CM

## 2025-05-20 DIAGNOSIS — E78.2 MIXED HYPERLIPIDEMIA: ICD-10-CM

## 2025-05-20 DIAGNOSIS — Z76.0 MEDICATION REFILL: ICD-10-CM

## 2025-05-20 DIAGNOSIS — I10 ESSENTIAL HYPERTENSION: ICD-10-CM

## 2025-05-20 DIAGNOSIS — N18.32 STAGE 3B CHRONIC KIDNEY DISEASE (HCC): ICD-10-CM

## 2025-05-20 PROCEDURE — 99214 OFFICE O/P EST MOD 30 MIN: CPT | Performed by: NURSE PRACTITIONER

## 2025-05-20 PROCEDURE — 1159F MED LIST DOCD IN RCRD: CPT | Performed by: NURSE PRACTITIONER

## 2025-05-20 PROCEDURE — G8417 CALC BMI ABV UP PARAM F/U: HCPCS | Performed by: NURSE PRACTITIONER

## 2025-05-20 PROCEDURE — 3078F DIAST BP <80 MM HG: CPT | Performed by: NURSE PRACTITIONER

## 2025-05-20 PROCEDURE — G0439 PPPS, SUBSEQ VISIT: HCPCS | Performed by: NURSE PRACTITIONER

## 2025-05-20 PROCEDURE — 1123F ACP DISCUSS/DSCN MKR DOCD: CPT | Performed by: NURSE PRACTITIONER

## 2025-05-20 PROCEDURE — 3077F SYST BP >= 140 MM HG: CPT | Performed by: NURSE PRACTITIONER

## 2025-05-20 PROCEDURE — 99497 ADVNCD CARE PLAN 30 MIN: CPT | Performed by: NURSE PRACTITIONER

## 2025-05-20 PROCEDURE — G8427 DOCREV CUR MEDS BY ELIG CLIN: HCPCS | Performed by: NURSE PRACTITIONER

## 2025-05-20 PROCEDURE — 1036F TOBACCO NON-USER: CPT | Performed by: NURSE PRACTITIONER

## 2025-05-20 RX ORDER — ATORVASTATIN CALCIUM 40 MG/1
40 TABLET, FILM COATED ORAL DAILY
Qty: 90 TABLET | Refills: 1 | Status: SHIPPED | OUTPATIENT
Start: 2025-05-20 | End: 2026-05-20

## 2025-05-20 RX ORDER — NIFEDIPINE 90 MG/1
90 TABLET, EXTENDED RELEASE ORAL DAILY
COMMUNITY
Start: 2025-04-28

## 2025-05-20 RX ORDER — LOSARTAN POTASSIUM 100 MG/1
100 TABLET ORAL DAILY
Qty: 90 TABLET | Refills: 1 | Status: SHIPPED | OUTPATIENT
Start: 2025-05-20 | End: 2026-05-20

## 2025-05-20 SDOH — ECONOMIC STABILITY: FOOD INSECURITY: WITHIN THE PAST 12 MONTHS, THE FOOD YOU BOUGHT JUST DIDN'T LAST AND YOU DIDN'T HAVE MONEY TO GET MORE.: NEVER TRUE

## 2025-05-20 SDOH — ECONOMIC STABILITY: FOOD INSECURITY: WITHIN THE PAST 12 MONTHS, YOU WORRIED THAT YOUR FOOD WOULD RUN OUT BEFORE YOU GOT MONEY TO BUY MORE.: NEVER TRUE

## 2025-05-20 NOTE — PROGRESS NOTES
Medicare Annual Wellness Visit    Franklin Vilchis is here for Medicare AWV    Assessment & Plan  1. Medicare wellness visit.  - Provided with information regarding advanced directives and advised to discuss this with family.  - Encouraged to maintain an active lifestyle, including regular gym visits.    2. Hypertension.  - Blood pressure is mildly elevated at 150/73 today.  - Currently taking atenolol 25 mg daily, losartan 100 mg, and nifedipine 90 mg as adjusted by Dr. Roque.  - A repeat blood pressure check will be performed during this visit.    3. Hyperlipidemia.  - Currently taking atorvastatin 40 mg daily.  - Prescription refill for atorvastatin has been sent to pharmacy.    4. Anemia.  - Continues to exhibit mild anemia, which could be attributed to chronic kidney disease.  - Scheduled for an EGD procedure on 08/22/2025 to evaluate potential causes of anemia, including any esophageal issues.  - Creatinine level remains at 2.1, with a slight decrease in filtration rate from 35 to 31.3 since 03/2025.    5. Chronic kidney disease (CKD) stage 3b.  - Kidney function has slightly declined, with a current creatinine level of 2.1 and a filtration rate of 31.3 as of 03/2025.  - Under the care of Dr. Roque, nephrologist, who is monitoring the condition.  Medicare annual wellness visit, subsequent  Mixed hyperlipidemia  -     atorvastatin (LIPITOR) 40 MG tablet; Take 1 tablet by mouth daily, Disp-90 tablet, R-1Normal  Stage 3b chronic kidney disease (HCC)  Essential hypertension  -     losartan (COZAAR) 100 MG tablet; Take 1 tablet by mouth daily, Disp-90 tablet, R-1Normal  Medication refill  -     atorvastatin (LIPITOR) 40 MG tablet; Take 1 tablet by mouth daily, Disp-90 tablet, R-1Normal  ACP (advance care planning)  -     [place] Advanced Care Planning (16-30 minutes) [69171]    Results  Labs   - Vitamin D level: 03/2025, 35   - Creatinine: 03/2025, 2.1   - Filtration rate: 03/2025, 31.3     Return in about 6 months

## 2025-08-05 ENCOUNTER — HOSPITAL ENCOUNTER (OUTPATIENT)
Dept: LAB | Age: 80
Discharge: HOME | End: 2025-08-05
Payer: MEDICARE

## 2025-08-05 LAB
ANION GAP SERPL CALCULATED.3IONS-SCNC: 13 MMOL/L (ref 9–16)
BUN SERPL-MCNC: 24 MG/DL (ref 8–23)
CALCIUM SERPL-MCNC: 9.5 MG/DL (ref 8.6–10.4)
CHLORIDE SERPL-SCNC: 108 MMOL/L (ref 98–107)
CO2 SERPL-SCNC: 22 MMOL/L (ref 20–31)
CREAT SERPL-MCNC: 2.6 MG/DL (ref 0.7–1.2)
GFR, ESTIMATED: 24 ML/MIN/1.73M2
GLUCOSE SERPL-MCNC: 93 MG/DL (ref 74–99)
POTASSIUM SERPL-SCNC: 4.4 MMOL/L (ref 3.7–5.3)
SODIUM SERPL-SCNC: 143 MMOL/L (ref 136–145)

## 2025-08-05 PROCEDURE — 36415 COLL VENOUS BLD VENIPUNCTURE: CPT

## 2025-08-05 PROCEDURE — 80048 BASIC METABOLIC PNL TOTAL CA: CPT

## 2025-08-22 ENCOUNTER — ANESTHESIA (OUTPATIENT)
Dept: OPERATING ROOM | Age: 80
End: 2025-08-22
Payer: MEDICARE

## 2025-08-22 ENCOUNTER — ANESTHESIA EVENT (OUTPATIENT)
Dept: OPERATING ROOM | Age: 80
End: 2025-08-22
Payer: MEDICARE

## 2025-08-22 ENCOUNTER — HOSPITAL ENCOUNTER (OUTPATIENT)
Age: 80
Setting detail: OUTPATIENT SURGERY
Discharge: HOME | End: 2025-08-22
Attending: INTERNAL MEDICINE | Admitting: INTERNAL MEDICINE
Payer: MEDICARE

## 2025-08-22 VITALS
RESPIRATION RATE: 16 BRPM | WEIGHT: 196 LBS | DIASTOLIC BLOOD PRESSURE: 84 MMHG | SYSTOLIC BLOOD PRESSURE: 180 MMHG | HEIGHT: 69 IN | OXYGEN SATURATION: 98 % | HEART RATE: 57 BPM | BODY MASS INDEX: 29.03 KG/M2 | TEMPERATURE: 97.2 F

## 2025-08-22 DIAGNOSIS — D64.9 ANEMIA: ICD-10-CM

## 2025-08-22 PROCEDURE — 2580000003 HC RX 258: Performed by: ANESTHESIOLOGY

## 2025-08-22 PROCEDURE — 3609012400 HC EGD TRANSORAL BIOPSY SINGLE/MULTIPLE: Performed by: INTERNAL MEDICINE

## 2025-08-22 PROCEDURE — 6360000002 HC RX W HCPCS: Performed by: NURSE ANESTHETIST, CERTIFIED REGISTERED

## 2025-08-22 PROCEDURE — 7100000011 HC PHASE II RECOVERY - ADDTL 15 MIN: Performed by: INTERNAL MEDICINE

## 2025-08-22 PROCEDURE — 88342 IMHCHEM/IMCYTCHM 1ST ANTB: CPT

## 2025-08-22 PROCEDURE — 2709999900 HC NON-CHARGEABLE SUPPLY: Performed by: INTERNAL MEDICINE

## 2025-08-22 PROCEDURE — 88305 TISSUE EXAM BY PATHOLOGIST: CPT

## 2025-08-22 PROCEDURE — 7100000010 HC PHASE II RECOVERY - FIRST 15 MIN: Performed by: INTERNAL MEDICINE

## 2025-08-22 PROCEDURE — 3700000000 HC ANESTHESIA ATTENDED CARE: Performed by: INTERNAL MEDICINE

## 2025-08-22 RX ORDER — LIDOCAINE HYDROCHLORIDE 10 MG/ML
1 INJECTION, SOLUTION EPIDURAL; INFILTRATION; INTRACAUDAL; PERINEURAL
Status: DISCONTINUED | OUTPATIENT
Start: 2025-08-23 | End: 2025-08-22 | Stop reason: HOSPADM

## 2025-08-22 RX ORDER — SODIUM CHLORIDE 0.9 % (FLUSH) 0.9 %
5-40 SYRINGE (ML) INJECTION EVERY 12 HOURS SCHEDULED
Status: DISCONTINUED | OUTPATIENT
Start: 2025-08-22 | End: 2025-08-22 | Stop reason: HOSPADM

## 2025-08-22 RX ORDER — SODIUM CHLORIDE 9 MG/ML
INJECTION, SOLUTION INTRAVENOUS PRN
Status: DISCONTINUED | OUTPATIENT
Start: 2025-08-22 | End: 2025-08-22 | Stop reason: HOSPADM

## 2025-08-22 RX ORDER — PROPOFOL 10 MG/ML
INJECTION, EMULSION INTRAVENOUS
Status: DISCONTINUED | OUTPATIENT
Start: 2025-08-22 | End: 2025-08-22 | Stop reason: SDUPTHER

## 2025-08-22 RX ORDER — LIDOCAINE HYDROCHLORIDE 20 MG/ML
INJECTION, SOLUTION INFILTRATION; PERINEURAL
Status: DISCONTINUED | OUTPATIENT
Start: 2025-08-22 | End: 2025-08-22 | Stop reason: SDUPTHER

## 2025-08-22 RX ORDER — SODIUM CHLORIDE 9 MG/ML
INJECTION, SOLUTION INTRAVENOUS CONTINUOUS
Status: DISCONTINUED | OUTPATIENT
Start: 2025-08-22 | End: 2025-08-22 | Stop reason: HOSPADM

## 2025-08-22 RX ORDER — SODIUM CHLORIDE, SODIUM LACTATE, POTASSIUM CHLORIDE, CALCIUM CHLORIDE 600; 310; 30; 20 MG/100ML; MG/100ML; MG/100ML; MG/100ML
INJECTION, SOLUTION INTRAVENOUS CONTINUOUS
Status: DISCONTINUED | OUTPATIENT
Start: 2025-08-22 | End: 2025-08-22 | Stop reason: HOSPADM

## 2025-08-22 RX ORDER — SODIUM CHLORIDE 0.9 % (FLUSH) 0.9 %
5-40 SYRINGE (ML) INJECTION PRN
Status: DISCONTINUED | OUTPATIENT
Start: 2025-08-22 | End: 2025-08-22 | Stop reason: HOSPADM

## 2025-08-22 RX ADMIN — LIDOCAINE HYDROCHLORIDE 60 MG: 20 INJECTION, SOLUTION INFILTRATION; PERINEURAL at 15:08

## 2025-08-22 RX ADMIN — PROPOFOL 10 MG: 10 INJECTION, EMULSION INTRAVENOUS at 15:12

## 2025-08-22 RX ADMIN — SODIUM CHLORIDE, POTASSIUM CHLORIDE, SODIUM LACTATE AND CALCIUM CHLORIDE: 600; 310; 30; 20 INJECTION, SOLUTION INTRAVENOUS at 13:28

## 2025-08-22 RX ADMIN — PROPOFOL 50 MG: 10 INJECTION, EMULSION INTRAVENOUS at 15:08

## 2025-08-22 ASSESSMENT — ENCOUNTER SYMPTOMS
SORE THROAT: 0
COUGH: 0
WHEEZING: 0
DIARRHEA: 0
CHEST TIGHTNESS: 0
NAUSEA: 0
CONSTIPATION: 0
SINUS PRESSURE: 0
BLOOD IN STOOL: 0
SINUS PAIN: 0
COLOR CHANGE: 0
SHORTNESS OF BREATH: 0
VOMITING: 0

## 2025-08-22 ASSESSMENT — PAIN - FUNCTIONAL ASSESSMENT: PAIN_FUNCTIONAL_ASSESSMENT: 0-10

## 2025-08-22 ASSESSMENT — PAIN SCALES - GENERAL
PAINLEVEL_OUTOF10: 0
PAINLEVEL_OUTOF10: 0

## 2025-08-26 ENCOUNTER — OFFICE VISIT (OUTPATIENT)
Dept: GASTROENTEROLOGY | Age: 80
End: 2025-08-26
Payer: MEDICARE

## 2025-08-26 VITALS
RESPIRATION RATE: 16 BRPM | TEMPERATURE: 98 F | BODY MASS INDEX: 29.98 KG/M2 | DIASTOLIC BLOOD PRESSURE: 74 MMHG | OXYGEN SATURATION: 98 % | WEIGHT: 203 LBS | SYSTOLIC BLOOD PRESSURE: 160 MMHG | HEART RATE: 68 BPM

## 2025-08-26 DIAGNOSIS — K57.90 DIVERTICULOSIS: ICD-10-CM

## 2025-08-26 DIAGNOSIS — Z86.0100 HX OF COLONIC POLYPS: ICD-10-CM

## 2025-08-26 DIAGNOSIS — K29.00 ACUTE SUPERFICIAL GASTRITIS WITHOUT HEMORRHAGE: ICD-10-CM

## 2025-08-26 DIAGNOSIS — D64.9 ANEMIA, UNSPECIFIED TYPE: Primary | ICD-10-CM

## 2025-08-26 DIAGNOSIS — K64.8 OTHER HEMORRHOIDS: ICD-10-CM

## 2025-08-26 PROCEDURE — 99214 OFFICE O/P EST MOD 30 MIN: CPT | Performed by: INTERNAL MEDICINE

## 2025-08-26 PROCEDURE — 3078F DIAST BP <80 MM HG: CPT | Performed by: INTERNAL MEDICINE

## 2025-08-26 PROCEDURE — 1159F MED LIST DOCD IN RCRD: CPT | Performed by: INTERNAL MEDICINE

## 2025-08-26 PROCEDURE — 1126F AMNT PAIN NOTED NONE PRSNT: CPT | Performed by: INTERNAL MEDICINE

## 2025-08-26 PROCEDURE — G8427 DOCREV CUR MEDS BY ELIG CLIN: HCPCS | Performed by: INTERNAL MEDICINE

## 2025-08-26 PROCEDURE — 1036F TOBACCO NON-USER: CPT | Performed by: INTERNAL MEDICINE

## 2025-08-26 PROCEDURE — 1123F ACP DISCUSS/DSCN MKR DOCD: CPT | Performed by: INTERNAL MEDICINE

## 2025-08-26 PROCEDURE — 3077F SYST BP >= 140 MM HG: CPT | Performed by: INTERNAL MEDICINE

## 2025-08-26 PROCEDURE — G8417 CALC BMI ABV UP PARAM F/U: HCPCS | Performed by: INTERNAL MEDICINE

## 2025-08-26 ASSESSMENT — ENCOUNTER SYMPTOMS
TROUBLE SWALLOWING: 0
BLOOD IN STOOL: 0
SORE THROAT: 0
ABDOMINAL PAIN: 0
NAUSEA: 0
WHEEZING: 0
RECTAL PAIN: 0
ABDOMINAL DISTENTION: 0
ANAL BLEEDING: 0
VOMITING: 0
COUGH: 0
VOICE CHANGE: 0
CONSTIPATION: 0
CHOKING: 0
DIARRHEA: 0

## 2025-08-27 LAB — SURGICAL PATHOLOGY REPORT: NORMAL

## 2025-08-28 ENCOUNTER — RESULTS FOLLOW-UP (OUTPATIENT)
Dept: GASTROENTEROLOGY | Age: 80
End: 2025-08-28

## 2025-08-28 DIAGNOSIS — B96.81 HELICOBACTER PYLORI GASTRITIS: Primary | ICD-10-CM

## 2025-08-28 DIAGNOSIS — K29.70 HELICOBACTER PYLORI GASTRITIS: Primary | ICD-10-CM

## 2025-08-28 RX ORDER — AMOXICILLIN 500 MG/1
1000 TABLET, FILM COATED ORAL 2 TIMES DAILY
Qty: 56 TABLET | Refills: 0 | Status: SHIPPED | OUTPATIENT
Start: 2025-08-28 | End: 2025-09-11

## 2025-08-28 RX ORDER — CLARITHROMYCIN 500 MG/1
500 TABLET ORAL 2 TIMES DAILY
Qty: 28 TABLET | Refills: 0 | Status: SHIPPED | OUTPATIENT
Start: 2025-08-28 | End: 2025-09-11

## 2025-08-28 RX ORDER — OMEPRAZOLE 20 MG/1
20 CAPSULE, DELAYED RELEASE ORAL 2 TIMES DAILY
Qty: 28 CAPSULE | Refills: 0 | Status: SHIPPED | OUTPATIENT
Start: 2025-08-28 | End: 2025-09-11

## 2025-09-03 ENCOUNTER — TELEPHONE (OUTPATIENT)
Dept: GASTROENTEROLOGY | Age: 80
End: 2025-09-03

## (undated) DEVICE — CUP MED 60ML 2OZ CLR GRAD DISP PLAS NO LID

## (undated) DEVICE — YANKAUER,FLEXIBLE HANDLE,REGLR CAPACITY: Brand: MEDLINE INDUSTRIES, INC.

## (undated) DEVICE — GOWN,AURORA,NONREINFORCED,LARGE: Brand: MEDLINE

## (undated) DEVICE — Device: Brand: DEFENDO VALVE AND CONNECTOR KIT

## (undated) DEVICE — JELLY,LUBE,STERILE,FLIP TOP,TUBE,2-OZ: Brand: MEDLINE

## (undated) DEVICE — SYRINGE MED 50ML LUERLOCK TIP

## (undated) DEVICE — CO2 CANNULA,SUPERSOFT, ADLT,7'O2,7'CO2: Brand: MEDLINE

## (undated) DEVICE — MEDICINE CUP, GRADUATED, STER: Brand: MEDLINE

## (undated) DEVICE — BITE BLOCK ENDOSCP AD 60 FR W/ ADJ STRP PLAS GRN BLOX

## (undated) DEVICE — ADAPTER TBNG LUER STUB 15 GA INTMED

## (undated) DEVICE — BASIN EMSIS 700ML GRAPHITE PLAS KID SHP GRAD

## (undated) DEVICE — GAUZE,SPONGE,4"X4",16PLY,STRL,LF,10/TRAY: Brand: MEDLINE

## (undated) DEVICE — STAZ ENDO KIT: Brand: MEDLINE INDUSTRIES, INC.

## (undated) DEVICE — FORCEPS BX L L240CM DIA2.4MM RAD JAW 4 HOT FOR POLYP DISP

## (undated) DEVICE — ENDOSCOPIC KIT 1.1+ 10 FT DE 2 GWN AAMI LEVEL 3

## (undated) DEVICE — TUBING, SUCTION, 1/4" X 12', STRAIGHT: Brand: MEDLINE

## (undated) DEVICE — FORCEPS BX L240CM WRK CHN 2.8MM STD CAP W/ NDL MIC MESH

## (undated) DEVICE — UNDERPAD HOSP W30XL36IN GRN POLYPR HI ABSRB DISP

## (undated) DEVICE — MANIFOLD SUCT 4 PRT 2 CANSTR FLTR DISP NEPTUNE 2